# Patient Record
Sex: FEMALE | Race: BLACK OR AFRICAN AMERICAN | NOT HISPANIC OR LATINO
[De-identification: names, ages, dates, MRNs, and addresses within clinical notes are randomized per-mention and may not be internally consistent; named-entity substitution may affect disease eponyms.]

---

## 2017-11-15 ENCOUNTER — APPOINTMENT (OUTPATIENT)
Dept: UROLOGY | Facility: CLINIC | Age: 62
End: 2017-11-15
Payer: COMMERCIAL

## 2017-11-15 VITALS
SYSTOLIC BLOOD PRESSURE: 120 MMHG | OXYGEN SATURATION: 98 % | TEMPERATURE: 98.4 F | WEIGHT: 164.31 LBS | HEIGHT: 65 IN | BODY MASS INDEX: 27.38 KG/M2 | HEART RATE: 64 BPM | DIASTOLIC BLOOD PRESSURE: 60 MMHG

## 2017-11-15 DIAGNOSIS — Z87.39 PERSONAL HISTORY OF OTHER DISEASES OF THE MUSCULOSKELETAL SYSTEM AND CONNECTIVE TISSUE: ICD-10-CM

## 2017-11-15 DIAGNOSIS — Z87.19 PERSONAL HISTORY OF OTHER DISEASES OF THE DIGESTIVE SYSTEM: ICD-10-CM

## 2017-11-15 DIAGNOSIS — Z87.09 PERSONAL HISTORY OF OTHER DISEASES OF THE RESPIRATORY SYSTEM: ICD-10-CM

## 2017-11-15 PROCEDURE — 99203 OFFICE O/P NEW LOW 30 MIN: CPT

## 2017-11-15 RX ORDER — ALBUTEROL SULFATE 90 UG/1
108 (90 BASE) AEROSOL, METERED RESPIRATORY (INHALATION)
Qty: 8 | Refills: 0 | Status: ACTIVE | COMMUNITY
Start: 2017-01-17

## 2017-11-15 RX ORDER — MONTELUKAST 10 MG/1
10 TABLET, FILM COATED ORAL
Qty: 30 | Refills: 0 | Status: ACTIVE | COMMUNITY
Start: 2017-06-09

## 2017-11-15 RX ORDER — CHROMIUM 200 MCG
TABLET ORAL
Refills: 0 | Status: ACTIVE | COMMUNITY

## 2017-11-15 RX ORDER — OMEPRAZOLE 20 MG/1
20 CAPSULE, DELAYED RELEASE ORAL
Qty: 30 | Refills: 0 | Status: ACTIVE | COMMUNITY
Start: 2017-10-10

## 2017-11-15 RX ORDER — FLUTICASONE PROPIONATE 50 MCG
50 SPRAY, SUSPENSION NASAL
Refills: 0 | Status: ACTIVE | COMMUNITY

## 2017-11-15 RX ORDER — FLUTICASONE PROPIONATE 50 UG/1
50 SPRAY, METERED NASAL
Qty: 16 | Refills: 0 | Status: ACTIVE | COMMUNITY
Start: 2017-02-27

## 2017-11-29 ENCOUNTER — FORM ENCOUNTER (OUTPATIENT)
Age: 62
End: 2017-11-29

## 2017-11-30 ENCOUNTER — OUTPATIENT (OUTPATIENT)
Dept: OUTPATIENT SERVICES | Facility: HOSPITAL | Age: 62
LOS: 1 days | End: 2017-11-30
Payer: COMMERCIAL

## 2017-11-30 ENCOUNTER — APPOINTMENT (OUTPATIENT)
Dept: CT IMAGING | Facility: IMAGING CENTER | Age: 62
End: 2017-11-30
Payer: COMMERCIAL

## 2017-11-30 ENCOUNTER — APPOINTMENT (OUTPATIENT)
Dept: CT IMAGING | Facility: IMAGING CENTER | Age: 62
End: 2017-11-30

## 2017-11-30 DIAGNOSIS — Z98.51 TUBAL LIGATION STATUS: Chronic | ICD-10-CM

## 2017-11-30 DIAGNOSIS — N20.0 CALCULUS OF KIDNEY: ICD-10-CM

## 2017-11-30 PROCEDURE — 74176 CT ABD & PELVIS W/O CONTRAST: CPT | Mod: 26

## 2017-12-06 ENCOUNTER — APPOINTMENT (OUTPATIENT)
Dept: UROLOGY | Facility: CLINIC | Age: 62
End: 2017-12-06
Payer: COMMERCIAL

## 2017-12-06 DIAGNOSIS — R10.9 UNSPECIFIED ABDOMINAL PAIN: ICD-10-CM

## 2017-12-06 DIAGNOSIS — Z87.442 PERSONAL HISTORY OF URINARY CALCULI: ICD-10-CM

## 2017-12-06 PROCEDURE — 99213 OFFICE O/P EST LOW 20 MIN: CPT

## 2021-05-05 ENCOUNTER — APPOINTMENT (OUTPATIENT)
Dept: ORTHOPEDIC SURGERY | Facility: CLINIC | Age: 66
End: 2021-05-05
Payer: MEDICARE

## 2021-05-05 ENCOUNTER — NON-APPOINTMENT (OUTPATIENT)
Age: 66
End: 2021-05-05

## 2021-05-05 PROCEDURE — 73564 X-RAY EXAM KNEE 4 OR MORE: CPT | Mod: LT

## 2021-05-05 PROCEDURE — 99072 ADDL SUPL MATRL&STAF TM PHE: CPT

## 2021-05-05 PROCEDURE — 99204 OFFICE O/P NEW MOD 45 MIN: CPT

## 2021-05-17 ENCOUNTER — OUTPATIENT (OUTPATIENT)
Dept: OUTPATIENT SERVICES | Facility: HOSPITAL | Age: 66
LOS: 1 days | End: 2021-05-17
Payer: COMMERCIAL

## 2021-05-17 VITALS
SYSTOLIC BLOOD PRESSURE: 153 MMHG | TEMPERATURE: 96 F | WEIGHT: 166.01 LBS | RESPIRATION RATE: 12 BRPM | DIASTOLIC BLOOD PRESSURE: 80 MMHG | HEIGHT: 63 IN | OXYGEN SATURATION: 99 % | HEART RATE: 60 BPM

## 2021-05-17 DIAGNOSIS — Z98.51 TUBAL LIGATION STATUS: Chronic | ICD-10-CM

## 2021-05-17 DIAGNOSIS — M17.12 UNILATERAL PRIMARY OSTEOARTHRITIS, LEFT KNEE: ICD-10-CM

## 2021-05-17 DIAGNOSIS — S50.852A SUPERFICIAL FOREIGN BODY OF LEFT FOREARM, INITIAL ENCOUNTER: ICD-10-CM

## 2021-05-17 DIAGNOSIS — Z01.818 ENCOUNTER FOR OTHER PREPROCEDURAL EXAMINATION: ICD-10-CM

## 2021-05-17 LAB
ALBUMIN SERPL ELPH-MCNC: 3.6 G/DL — SIGNIFICANT CHANGE UP (ref 3.3–5)
ALP SERPL-CCNC: 144 U/L — HIGH (ref 30–120)
ALT FLD-CCNC: 28 U/L DA — SIGNIFICANT CHANGE UP (ref 10–60)
ANION GAP SERPL CALC-SCNC: 3 MMOL/L — LOW (ref 5–17)
APTT BLD: 34.9 SEC — SIGNIFICANT CHANGE UP (ref 27.5–35.5)
AST SERPL-CCNC: 20 U/L — SIGNIFICANT CHANGE UP (ref 10–40)
BILIRUB SERPL-MCNC: 0.9 MG/DL — SIGNIFICANT CHANGE UP (ref 0.2–1.2)
BLD GP AB SCN SERPL QL: SIGNIFICANT CHANGE UP
BUN SERPL-MCNC: 11 MG/DL — SIGNIFICANT CHANGE UP (ref 7–23)
CALCIUM SERPL-MCNC: 9.2 MG/DL — SIGNIFICANT CHANGE UP (ref 8.4–10.5)
CHLORIDE SERPL-SCNC: 108 MMOL/L — SIGNIFICANT CHANGE UP (ref 96–108)
CO2 SERPL-SCNC: 31 MMOL/L — SIGNIFICANT CHANGE UP (ref 22–31)
CREAT SERPL-MCNC: 0.85 MG/DL — SIGNIFICANT CHANGE UP (ref 0.5–1.3)
GLUCOSE SERPL-MCNC: 109 MG/DL — HIGH (ref 70–99)
HCT VFR BLD CALC: 47 % — HIGH (ref 34.5–45)
HGB BLD-MCNC: 14.5 G/DL — SIGNIFICANT CHANGE UP (ref 11.5–15.5)
INR BLD: 1.12 RATIO — SIGNIFICANT CHANGE UP (ref 0.88–1.16)
MCHC RBC-ENTMCNC: 26.6 PG — LOW (ref 27–34)
MCHC RBC-ENTMCNC: 30.9 GM/DL — LOW (ref 32–36)
MCV RBC AUTO: 86.1 FL — SIGNIFICANT CHANGE UP (ref 80–100)
MRSA PCR RESULT.: SIGNIFICANT CHANGE UP
NRBC # BLD: 0 /100 WBCS — SIGNIFICANT CHANGE UP (ref 0–0)
PLATELET # BLD AUTO: 238 K/UL — SIGNIFICANT CHANGE UP (ref 150–400)
POTASSIUM SERPL-MCNC: 4.4 MMOL/L — SIGNIFICANT CHANGE UP (ref 3.5–5.3)
POTASSIUM SERPL-SCNC: 4.4 MMOL/L — SIGNIFICANT CHANGE UP (ref 3.5–5.3)
PROT SERPL-MCNC: 7.4 G/DL — SIGNIFICANT CHANGE UP (ref 6–8.3)
PROTHROM AB SERPL-ACNC: 13.5 SEC — SIGNIFICANT CHANGE UP (ref 10.6–13.6)
RBC # BLD: 5.46 M/UL — HIGH (ref 3.8–5.2)
RBC # FLD: 14.1 % — SIGNIFICANT CHANGE UP (ref 10.3–14.5)
S AUREUS DNA NOSE QL NAA+PROBE: SIGNIFICANT CHANGE UP
SODIUM SERPL-SCNC: 142 MMOL/L — SIGNIFICANT CHANGE UP (ref 135–145)
WBC # BLD: 4.78 K/UL — SIGNIFICANT CHANGE UP (ref 3.8–10.5)
WBC # FLD AUTO: 4.78 K/UL — SIGNIFICANT CHANGE UP (ref 3.8–10.5)

## 2021-05-17 PROCEDURE — 93010 ELECTROCARDIOGRAM REPORT: CPT

## 2021-05-17 PROCEDURE — G0463: CPT

## 2021-05-17 PROCEDURE — 93005 ELECTROCARDIOGRAM TRACING: CPT

## 2021-05-17 NOTE — H&P PST ADULT - MUSCULOSKELETAL
detailed exam no joint erythema/no joint warmth/no calf tenderness/decreased ROM due to pain details…

## 2021-05-17 NOTE — H&P PST ADULT - NSICDXPROBLEM_GEN_ALL_CORE_FT
PROBLEM DIAGNOSES  Problem: Osteoarthritis of left knee  Assessment and Plan: Left total knee replacement is planned for 5/27/2021.  Diagnostic testing performed; medical clearance requested.  Pre op instructions were reviewed; best wishes offered.        R/O PROBLEM DIAGNOSES  Problem: R/O Osteoarthritis of left knee  Assessment and Plan:

## 2021-05-17 NOTE — H&P PST ADULT - ASSESSMENT
64 yo female is scheduled for left total knee replacement on 5/27/2021 at [facility].

## 2021-05-17 NOTE — H&P PST ADULT - HISTORY OF PRESENT ILLNESS
64 yo female reports longstanging left knee pain with swelling rating 10/10 when she is weight bearing, stair climbing.  She is scheduled for left total knee replacement on 5/27/2021 at [facility].

## 2021-05-17 NOTE — H&P PST ADULT - NSICDXPASTMEDICALHX_GEN_ALL_CORE_FT
PAST MEDICAL HISTORY:  Asthma     COVID-19 vaccine series completed 3/31/2021    Osteoarthritis of left knee     Seasonal allergies

## 2021-05-19 ENCOUNTER — APPOINTMENT (OUTPATIENT)
Dept: ORTHOPEDIC SURGERY | Facility: CLINIC | Age: 66
End: 2021-05-19
Payer: MEDICARE

## 2021-05-19 PROCEDURE — 99213 OFFICE O/P EST LOW 20 MIN: CPT

## 2021-05-21 RX ORDER — ACETAMINOPHEN 500 MG
1000 TABLET ORAL ONCE
Refills: 0 | Status: COMPLETED | OUTPATIENT
Start: 2021-06-01 | End: 2021-06-01

## 2021-05-21 RX ORDER — CEFAZOLIN SODIUM 1 G
2000 VIAL (EA) INJECTION ONCE
Refills: 0 | Status: COMPLETED | OUTPATIENT
Start: 2021-06-01 | End: 2021-06-01

## 2021-05-21 RX ORDER — CHLORHEXIDINE GLUCONATE 213 G/1000ML
1 SOLUTION TOPICAL ONCE
Refills: 0 | Status: COMPLETED | OUTPATIENT
Start: 2021-06-01 | End: 2021-06-01

## 2021-05-21 RX ORDER — TRANEXAMIC ACID 100 MG/ML
1000 INJECTION, SOLUTION INTRAVENOUS ONCE
Refills: 0 | Status: COMPLETED | OUTPATIENT
Start: 2021-06-01 | End: 2021-06-01

## 2021-05-21 RX ORDER — APREPITANT 80 MG/1
40 CAPSULE ORAL ONCE
Refills: 0 | Status: COMPLETED | OUTPATIENT
Start: 2021-06-01 | End: 2021-06-01

## 2021-05-24 ENCOUNTER — LABORATORY RESULT (OUTPATIENT)
Age: 66
End: 2021-05-24

## 2021-05-27 RX ORDER — POLYETHYLENE GLYCOL 3350 17 G/17G
17 POWDER, FOR SOLUTION ORAL AT BEDTIME
Refills: 0 | Status: DISCONTINUED | OUTPATIENT
Start: 2021-06-01 | End: 2021-06-03

## 2021-05-27 RX ORDER — SODIUM CHLORIDE 9 MG/ML
500 INJECTION INTRAMUSCULAR; INTRAVENOUS; SUBCUTANEOUS ONCE
Refills: 0 | Status: COMPLETED | OUTPATIENT
Start: 2021-06-01 | End: 2021-06-01

## 2021-05-27 RX ORDER — SENNA PLUS 8.6 MG/1
2 TABLET ORAL AT BEDTIME
Refills: 0 | Status: DISCONTINUED | OUTPATIENT
Start: 2021-06-01 | End: 2021-06-03

## 2021-05-27 RX ORDER — CELECOXIB 200 MG/1
200 CAPSULE ORAL EVERY 12 HOURS
Refills: 0 | Status: DISCONTINUED | OUTPATIENT
Start: 2021-06-01 | End: 2021-06-03

## 2021-05-27 RX ORDER — OXYCODONE HYDROCHLORIDE 5 MG/1
10 TABLET ORAL
Refills: 0 | Status: DISCONTINUED | OUTPATIENT
Start: 2021-06-01 | End: 2021-06-03

## 2021-05-27 RX ORDER — OXYCODONE HYDROCHLORIDE 5 MG/1
5 TABLET ORAL
Refills: 0 | Status: DISCONTINUED | OUTPATIENT
Start: 2021-06-01 | End: 2021-06-03

## 2021-05-27 RX ORDER — ONDANSETRON 8 MG/1
4 TABLET, FILM COATED ORAL EVERY 6 HOURS
Refills: 0 | Status: DISCONTINUED | OUTPATIENT
Start: 2021-06-01 | End: 2021-06-03

## 2021-05-27 RX ORDER — ACETAMINOPHEN 500 MG
1000 TABLET ORAL EVERY 8 HOURS
Refills: 0 | Status: DISCONTINUED | OUTPATIENT
Start: 2021-06-02 | End: 2021-06-03

## 2021-05-27 RX ORDER — ACETAMINOPHEN 500 MG
1000 TABLET ORAL ONCE
Refills: 0 | Status: COMPLETED | OUTPATIENT
Start: 2021-06-01 | End: 2021-06-01

## 2021-05-27 RX ORDER — HYDROMORPHONE HYDROCHLORIDE 2 MG/ML
0.5 INJECTION INTRAMUSCULAR; INTRAVENOUS; SUBCUTANEOUS
Refills: 0 | Status: DISCONTINUED | OUTPATIENT
Start: 2021-06-01 | End: 2021-06-03

## 2021-05-27 RX ORDER — CEFAZOLIN SODIUM 1 G
2000 VIAL (EA) INJECTION EVERY 8 HOURS
Refills: 0 | Status: COMPLETED | OUTPATIENT
Start: 2021-06-01 | End: 2021-06-02

## 2021-05-27 RX ORDER — PANTOPRAZOLE SODIUM 20 MG/1
40 TABLET, DELAYED RELEASE ORAL
Refills: 0 | Status: DISCONTINUED | OUTPATIENT
Start: 2021-06-01 | End: 2021-06-03

## 2021-05-27 RX ORDER — MAGNESIUM HYDROXIDE 400 MG/1
30 TABLET, CHEWABLE ORAL DAILY
Refills: 0 | Status: DISCONTINUED | OUTPATIENT
Start: 2021-06-01 | End: 2021-06-03

## 2021-05-27 RX ORDER — SODIUM CHLORIDE 9 MG/ML
1000 INJECTION, SOLUTION INTRAVENOUS
Refills: 0 | Status: DISCONTINUED | OUTPATIENT
Start: 2021-06-01 | End: 2021-06-02

## 2021-05-28 NOTE — ASU PATIENT PROFILE, ADULT - VISION (WITH CORRECTIVE LENSES IF THE PATIENT USUALLY WEARS THEM):
Aspen neck brace applied by danitza RN and Kamron Golden RN. MSP's intact before and after.      Vandana Marcos RN  05/12/21 4485 Partially impaired: cannot see medication labels or newsprint, but can see obstacles in path, and the surrounding layout; can count fingers at arm's length

## 2021-05-31 ENCOUNTER — TRANSCRIPTION ENCOUNTER (OUTPATIENT)
Age: 66
End: 2021-05-31

## 2021-06-01 ENCOUNTER — RESULT REVIEW (OUTPATIENT)
Age: 66
End: 2021-06-01

## 2021-06-01 ENCOUNTER — INPATIENT (INPATIENT)
Facility: HOSPITAL | Age: 66
LOS: 1 days | Discharge: HOME | DRG: 470 | End: 2021-06-03
Attending: ORTHOPAEDIC SURGERY | Admitting: ORTHOPAEDIC SURGERY
Payer: COMMERCIAL

## 2021-06-01 ENCOUNTER — TRANSCRIPTION ENCOUNTER (OUTPATIENT)
Age: 66
End: 2021-06-01

## 2021-06-01 ENCOUNTER — APPOINTMENT (OUTPATIENT)
Dept: ORTHOPEDIC SURGERY | Facility: HOSPITAL | Age: 66
End: 2021-06-01
Payer: MEDICARE

## 2021-06-01 VITALS
DIASTOLIC BLOOD PRESSURE: 71 MMHG | SYSTOLIC BLOOD PRESSURE: 141 MMHG | TEMPERATURE: 98 F | RESPIRATION RATE: 12 BRPM | OXYGEN SATURATION: 100 % | HEIGHT: 63 IN | WEIGHT: 215.39 LBS | HEART RATE: 68 BPM

## 2021-06-01 DIAGNOSIS — Z98.51 TUBAL LIGATION STATUS: Chronic | ICD-10-CM

## 2021-06-01 DIAGNOSIS — S50.852A SUPERFICIAL FOREIGN BODY OF LEFT FOREARM, INITIAL ENCOUNTER: ICD-10-CM

## 2021-06-01 DIAGNOSIS — M17.12 UNILATERAL PRIMARY OSTEOARTHRITIS, LEFT KNEE: ICD-10-CM

## 2021-06-01 DIAGNOSIS — Z01.818 ENCOUNTER FOR OTHER PREPROCEDURAL EXAMINATION: ICD-10-CM

## 2021-06-01 LAB
ANION GAP SERPL CALC-SCNC: 8 MMOL/L — SIGNIFICANT CHANGE UP (ref 5–17)
BUN SERPL-MCNC: 10 MG/DL — SIGNIFICANT CHANGE UP (ref 7–23)
CALCIUM SERPL-MCNC: 8.3 MG/DL — LOW (ref 8.4–10.5)
CHLORIDE SERPL-SCNC: 108 MMOL/L — SIGNIFICANT CHANGE UP (ref 96–108)
CO2 SERPL-SCNC: 26 MMOL/L — SIGNIFICANT CHANGE UP (ref 22–31)
CREAT SERPL-MCNC: 1.06 MG/DL — SIGNIFICANT CHANGE UP (ref 0.5–1.3)
GLUCOSE SERPL-MCNC: 182 MG/DL — HIGH (ref 70–99)
HCT VFR BLD CALC: 44.6 % — SIGNIFICANT CHANGE UP (ref 34.5–45)
HGB BLD-MCNC: 14 G/DL — SIGNIFICANT CHANGE UP (ref 11.5–15.5)
MCHC RBC-ENTMCNC: 27 PG — SIGNIFICANT CHANGE UP (ref 27–34)
MCHC RBC-ENTMCNC: 31.4 GM/DL — LOW (ref 32–36)
MCV RBC AUTO: 85.9 FL — SIGNIFICANT CHANGE UP (ref 80–100)
NRBC # BLD: 0 /100 WBCS — SIGNIFICANT CHANGE UP (ref 0–0)
PLATELET # BLD AUTO: 225 K/UL — SIGNIFICANT CHANGE UP (ref 150–400)
POTASSIUM SERPL-MCNC: 3.4 MMOL/L — LOW (ref 3.5–5.3)
POTASSIUM SERPL-SCNC: 3.4 MMOL/L — LOW (ref 3.5–5.3)
RBC # BLD: 5.19 M/UL — SIGNIFICANT CHANGE UP (ref 3.8–5.2)
RBC # FLD: 13.8 % — SIGNIFICANT CHANGE UP (ref 10.3–14.5)
SODIUM SERPL-SCNC: 142 MMOL/L — SIGNIFICANT CHANGE UP (ref 135–145)
WBC # BLD: 11.12 K/UL — HIGH (ref 3.8–10.5)
WBC # FLD AUTO: 11.12 K/UL — HIGH (ref 3.8–10.5)

## 2021-06-01 PROCEDURE — 88311 DECALCIFY TISSUE: CPT | Mod: 26

## 2021-06-01 PROCEDURE — 88305 TISSUE EXAM BY PATHOLOGIST: CPT | Mod: 26

## 2021-06-01 PROCEDURE — 73560 X-RAY EXAM OF KNEE 1 OR 2: CPT | Mod: 26,LT

## 2021-06-01 PROCEDURE — 27447 TOTAL KNEE ARTHROPLASTY: CPT | Mod: LT

## 2021-06-01 PROCEDURE — ZZZZZ: CPT

## 2021-06-01 PROCEDURE — 99223 1ST HOSP IP/OBS HIGH 75: CPT

## 2021-06-01 RX ORDER — ONDANSETRON 8 MG/1
4 TABLET, FILM COATED ORAL ONCE
Refills: 0 | Status: DISCONTINUED | OUTPATIENT
Start: 2021-06-01 | End: 2021-06-01

## 2021-06-01 RX ORDER — HYDROMORPHONE HYDROCHLORIDE 2 MG/ML
0.5 INJECTION INTRAMUSCULAR; INTRAVENOUS; SUBCUTANEOUS
Refills: 0 | Status: DISCONTINUED | OUTPATIENT
Start: 2021-06-01 | End: 2021-06-01

## 2021-06-01 RX ORDER — SODIUM CHLORIDE 9 MG/ML
1000 INJECTION, SOLUTION INTRAVENOUS
Refills: 0 | Status: DISCONTINUED | OUTPATIENT
Start: 2021-06-01 | End: 2021-06-01

## 2021-06-01 RX ORDER — FLUTICASONE PROPIONATE 50 MCG
1 SPRAY, SUSPENSION NASAL
Refills: 0 | Status: DISCONTINUED | OUTPATIENT
Start: 2021-06-01 | End: 2021-06-03

## 2021-06-01 RX ORDER — ALBUTEROL 90 UG/1
2 AEROSOL, METERED ORAL EVERY 6 HOURS
Refills: 0 | Status: DISCONTINUED | OUTPATIENT
Start: 2021-06-01 | End: 2021-06-03

## 2021-06-01 RX ORDER — SODIUM CHLORIDE 9 MG/ML
500 INJECTION INTRAMUSCULAR; INTRAVENOUS; SUBCUTANEOUS ONCE
Refills: 0 | Status: COMPLETED | OUTPATIENT
Start: 2021-06-01 | End: 2021-06-01

## 2021-06-01 RX ORDER — ASPIRIN/CALCIUM CARB/MAGNESIUM 324 MG
81 TABLET ORAL EVERY 12 HOURS
Refills: 0 | Status: DISCONTINUED | OUTPATIENT
Start: 2021-06-02 | End: 2021-06-03

## 2021-06-01 RX ADMIN — SODIUM CHLORIDE 125 MILLILITER(S): 9 INJECTION, SOLUTION INTRAVENOUS at 20:48

## 2021-06-01 RX ADMIN — OXYCODONE HYDROCHLORIDE 10 MILLIGRAM(S): 5 TABLET ORAL at 20:47

## 2021-06-01 RX ADMIN — SENNA PLUS 2 TABLET(S): 8.6 TABLET ORAL at 21:07

## 2021-06-01 RX ADMIN — CELECOXIB 200 MILLIGRAM(S): 200 CAPSULE ORAL at 21:07

## 2021-06-01 RX ADMIN — Medication 1000 MILLIGRAM(S): at 17:07

## 2021-06-01 RX ADMIN — Medication 100 MILLIGRAM(S): at 17:04

## 2021-06-01 RX ADMIN — SODIUM CHLORIDE 1000 MILLILITER(S): 9 INJECTION INTRAMUSCULAR; INTRAVENOUS; SUBCUTANEOUS at 15:36

## 2021-06-01 RX ADMIN — CELECOXIB 200 MILLIGRAM(S): 200 CAPSULE ORAL at 21:39

## 2021-06-01 RX ADMIN — SODIUM CHLORIDE 1000 MILLILITER(S): 9 INJECTION INTRAMUSCULAR; INTRAVENOUS; SUBCUTANEOUS at 12:27

## 2021-06-01 RX ADMIN — Medication 1000 MILLIGRAM(S): at 23:39

## 2021-06-01 RX ADMIN — Medication 400 MILLIGRAM(S): at 23:06

## 2021-06-01 RX ADMIN — OXYCODONE HYDROCHLORIDE 10 MILLIGRAM(S): 5 TABLET ORAL at 21:20

## 2021-06-01 RX ADMIN — APREPITANT 40 MILLIGRAM(S): 80 CAPSULE ORAL at 07:19

## 2021-06-01 RX ADMIN — Medication 400 MILLIGRAM(S): at 16:43

## 2021-06-01 RX ADMIN — CHLORHEXIDINE GLUCONATE 1 APPLICATION(S): 213 SOLUTION TOPICAL at 07:19

## 2021-06-01 NOTE — PHYSICAL THERAPY INITIAL EVALUATION ADULT - GAIT DEVIATIONS NOTED, PT EVAL
left knee buckling/decreased ivy/decreased velocity of limb motion/decreased step length/decreased stride length/decreased weight-shifting ability

## 2021-06-01 NOTE — PHYSICAL THERAPY INITIAL EVALUATION ADULT - RANGE OF MOTION EXAMINATION, REHAB EVAL
left knee flexion 60 deg in sitting/Right LE ROM was WFL (within functional limits)/deficits as listed below

## 2021-06-01 NOTE — DISCHARGE NOTE PROVIDER - HOSPITAL COURSE
The patient is a 66 y.o.  female with severe osteoarthritis of the left knee.   She was evaluated by the PST dept at [facility] and obtained the appropriate medical clearances.  After admission on 6/1/21 and receiving pre-operative parenteral prophylactic antibiotics (ancef), the patient  underwent an  uncomplicated left TKA by orthopedic surgeon Dr. Mitch Hernández.    A medical consultation from the Hospitalist service was obtained for post-operative medical co-management. Typical Physical & occupational therapy modalities post TKA were performed including ambulation training, range of motion, ADL's, and transfers. Ecotrin 81 mg every 12 hrs, along w/ bilat venodynes, was given for DVT prophylaxis (caprini 8).  The patient had a clean appearing surgical incision with no sign of surgical site infections and had a stable neuro / vascular exam of the operated extremity.  After progression of mobility guided by the PT/ OT staff,  the patient was felt to benefit from further rehabilitative care for restoration to level of function. This was felt to best be accomplished in a ________________.  Discharge and Orthopedic Care instructions were delineated in the Discharge Plan and reviewed with the patient. All medications were delineated in the medication reconciliation tool and key points were reviewed with the patient. They were deemed stable from an Orthopedic & medical standpoint for discharge today.  Upon ( discharge from the rehab facility ) or (completion of Home care ) they will be  following up with Dr. Hernández for office  follow up Orthopedic care.   The patient is a 66 y.o.  female with severe osteoarthritis of the left knee.   She was evaluated by the PST dept at [facility] and obtained the appropriate medical clearances.  After admission on 6/1/21 and receiving pre-operative parenteral prophylactic antibiotics (ancef), the patient  underwent an  uncomplicated left TKA by orthopedic surgeon Dr. Mitch Hernández.    A medical consultation from the Hospitalist service was obtained for post-operative medical co-management. Typical Physical & occupational therapy modalities post TKA were performed including ambulation training, range of motion, ADL's, and transfers. Ecotrin 81 mg every 12 hrs, along w/ bilat venodynes, was given for DVT prophylaxis (caprini 8).  The patient had a clean appearing surgical incision with no sign of surgical site infections and had a stable neuro / vascular exam of the operated extremity.  After progression of mobility guided by the PT/ OT staff,  the patient was felt to benefit from further rehabilitative care for restoration to level of function. This was felt to best be accomplished in a sub acute rehab.  Discharge and Orthopedic Care instructions were delineated in the Discharge Plan and reviewed with the patient. All medications were delineated in the medication reconciliation tool and key points were reviewed with the patient. They were deemed stable from an Orthopedic & medical standpoint for discharge today.  Upon ( discharge from the rehab facility ) or (completion of Home care ) they will be  following up with Dr. Hernández for office  follow up Orthopedic care.

## 2021-06-01 NOTE — DISCHARGE NOTE PROVIDER - CARE PROVIDER_API CALL
WILFREDO Hernández (MD)  Orthopaedic Surgery  [address], [address]  [place] [address]  Phone: [phone]  Fax: [phone]  Established Patient  Follow Up Time: 2 weeks

## 2021-06-01 NOTE — PHYSICAL THERAPY INITIAL EVALUATION ADULT - ADDITIONAL COMMENTS
Pt lives with spouse in a house w/ 4 steps to enter with no rail, 6 steps inside with rail.  Pt has no DME.  Pt reports  has been experiencing vertigo since Sunday and will not be able to assist pt at home.

## 2021-06-01 NOTE — OCCUPATIONAL THERAPY INITIAL EVALUATION ADULT - DIAGNOSIS, OT EVAL
Pt with impaired ROM, strength, balance, sensation impacting pt's ability to complete ADLs, IADLs, functional mobility/transfers.

## 2021-06-01 NOTE — OCCUPATIONAL THERAPY INITIAL EVALUATION ADULT - LIVES WITH, PROFILE
Pt lives with her , who she reports has recently gotten sick with vertigo and is unable to help her at home, in a private home, 4 steps to enter with 6 steps inside with a tub. Pt was independent with ADLs, IADLs, functional mobility/transfers, driving prior to admission without AD./spouse

## 2021-06-01 NOTE — DISCHARGE NOTE PROVIDER - INSTRUCTIONS
regular  For Constipation :   • Increase your water intake. Drink at least 8 glasses of water daily.  • Try adding fiber to your diet by eating fruits, vegetables and foods that are rich in grains.  • If you do experience constipation, you may take an over-the-counter stool softener/laxative such as Rhona Colace, Senekot, miralax or  Milk of Magnesia.

## 2021-06-01 NOTE — DISCHARGE NOTE PROVIDER - NSDCFUADDAPPT_GEN_ALL_CORE_FT
Call Dr Hernández's office for follow up appt in 2 weeks.  It is advisable to follow up w/ your pcp in 2-3 weeks to be sure there are no underlying problems.

## 2021-06-01 NOTE — DISCHARGE NOTE PROVIDER - NSDCCPCAREPLAN_GEN_ALL_CORE_FT
PRINCIPAL DISCHARGE DIAGNOSIS  Diagnosis: Primary osteoarthritis of left knee  Assessment and Plan of Treatment: left TKA  Physical Therapy/Occupational Therapy for ambulation, transfers, stairs, ADL's, Range of Motion Exercises, Isometrics.  Full weight bearing as tolerated with rolling walker  Range of Motion Goals: Flexion 120 degrees; Extension 0 degrees  Keep incision clean and dry.  You may shower.  Prineo may get slightly wet, but don't soak it as it may loosen.  Prineo dressing removal 14 days after surgery at rehab facility or Surgeon's office

## 2021-06-01 NOTE — OCCUPATIONAL THERAPY INITIAL EVALUATION ADULT - BALANCE TRAINING, PT EVAL
Goal: Pt will improve dynamic standing balance to G to increase safety for ADL/IADL completion in 2 weeks.

## 2021-06-01 NOTE — DISCHARGE NOTE PROVIDER - NSDCMRMEDTOKEN_GEN_ALL_CORE_FT
Albuterol (Eqv-Proventil HFA) 90 mcg/inh inhalation aerosol: 2 puff(s) inhaled every 6 hours, As Needed  Benadryl 50 mg oral capsule: 1 cap(s) orally once, As Needed  Flonase 50 mcg/inh nasal spray: 1 spray(s) nasal once a day  multivitamin:   Vitamin B12:   Vitamin D3 2000 intl units (50 mcg) oral capsule:    acetaminophen 500 mg oral tablet: 2 tab(s) orally every 8 hours  Albuterol (Eqv-Proventil HFA) 90 mcg/inh inhalation aerosol: 2 puff(s) inhaled every 6 hours, As Needed  aspirin 81 mg oral delayed release tablet: 1 tab(s) orally every 12 hours  take two hours before celebrex  for 42 days   Benadryl 50 mg oral capsule: 1 cap(s) orally once, As Needed  bisacodyl 10 mg rectal suppository: 1 suppository(ies) rectal once, As needed, Constipation  celecoxib 200 mg oral capsule: 1 cap(s) orally every 12 hours  30 days   fluticasone 50 mcg/inh nasal spray: 1 spray(s) nasal   magnesium hydroxide 8% oral suspension: 30 milliliter(s) orally once a day, As needed, Constipation  multivitamin:   omeprazole 20 mg oral delayed release tablet: 1 tab(s) orally once a day   oxyCODONE 10 mg oral tablet: 1 tab(s) orally every 3 hours, As needed, Severe Pain (7 - 10)  oxyCODONE 5 mg oral tablet: 1 tab(s) orally every 3 hours, As needed, Moderate Pain (4 - 6)  polyethylene glycol 3350 oral powder for reconstitution: 17 gram(s) orally once a day (at bedtime)  senna oral tablet: 2 tab(s) orally once a day (at bedtime)  Vitamin B12:   Vitamin D3 2000 intl units (50 mcg) oral capsule:

## 2021-06-01 NOTE — CONSULT NOTE ADULT - ASSESSMENT
Aftercare following left TKR 6/1/21    pain meds oxycodone nad dilaudid.  Monitor for respiratory depression and lethargy.  PT/OT.  DVT prophylaxis.  DVT ppx: [x ]ASA81 [ ] UNJ125 [ ] Lovenox [ ] Coumadin   [ ] Eliquis [  ] Heparin  Dispo:     Home [ ]     Acute Rehab [ ]     BILLIE [ ]     TBD [x ]    OA left Knee Aftercare following left TKR 6/1/21    pain meds oxycodone nad dilaudid.  Monitor for respiratory depression and lethargy.  PT/OT.  DVT prophylaxis.  DVT ppx: [x ]ASA81 [ ] KNT719 [ ] Lovenox [ ] Coumadin   [ ] Eliquis [  ] Heparin  Dispo:     Home [ ]     Acute Rehab [ ]     BILLIE [ ]     TBD [x ]    OA left Knee    Plan of care was discuss with patient, all questions were answered, seems understand and in agreement.   Aftercare following left TKR 6/1/21    pain meds oxycodone nad dilaudid.  Monitor for respiratory depression and lethargy.  PT/OT.  DVT prophylaxis.  DVT ppx: [x ]ASA81 [ ] VAW850 [ ] Lovenox [ ] Coumadin   [ ] Eliquis [  ] Heparin  Dispo:     Home [ ]     Acute Rehab [ ]     BILLIE [ ]     TBD [x ]    OA left Knee    Asthma   stable.    Plan of care was discuss with patient, all questions were answered, seems understand and in agreement.

## 2021-06-01 NOTE — CONSULT NOTE ADULT - SUBJECTIVE AND OBJECTIVE BOX
Patient is a 66y old  Female who presents with a chief complaint of left knee pain-- for left TKA (01 Jun 2021 11:50)      64 yo female reports longstanging left knee pain with swelling rating 10/10 when she is weight bearing, stair climbing.  S/Pr left total knee replacement on 5/27/2021 at [facility].      HPI: Patient is seen and examined.      PAST MEDICAL & SURGICAL HISTORY:  Seasonal allergies    Osteoarthritis of left knee    Asthma    COVID-19 vaccine series completed  3/31/2021    History of tubal ligation  1991        MEDICATIONS  (STANDING):  acetaminophen  IVPB .. 1000 milliGRAM(s) IV Intermittent once  acetaminophen  IVPB .. 1000 milliGRAM(s) IV Intermittent once  ceFAZolin   IVPB 2000 milliGRAM(s) IV Intermittent every 8 hours  celecoxib 200 milliGRAM(s) Oral every 12 hours  fluticasone propionate 50 MICROgram(s)/spray Nasal Spray 1 Spray(s) Both Nostrils <User Schedule>  lactated ringers. 1000 milliLiter(s) (125 mL/Hr) IV Continuous <Continuous>  pantoprazole    Tablet 40 milliGRAM(s) Oral before breakfast  polyethylene glycol 3350 17 Gram(s) Oral at bedtime  senna 2 Tablet(s) Oral at bedtime  sodium chloride 0.9% Bolus 500 milliLiter(s) IV Bolus once    MEDICATIONS  (PRN):  ALBUTerol    90 MICROgram(s) HFA Inhaler 2 Puff(s) Inhalation every 6 hours PRN Shortness of Breath and/or Wheezing  HYDROmorphone  Injectable 0.5 milliGRAM(s) IV Push every 3 hours PRN breakthrough pain  magnesium hydroxide Suspension 30 milliLiter(s) Oral daily PRN Constipation  ondansetron Injectable 4 milliGRAM(s) IV Push every 6 hours PRN Nausea and/or Vomiting  oxyCODONE    IR 5 milliGRAM(s) Oral every 3 hours PRN Moderate Pain (4 - 6)  oxyCODONE    IR 10 milliGRAM(s) Oral every 3 hours PRN Severe Pain (7 - 10)      Allergies    No Known Allergies    Intolerances    SOCIAL HISTORY:  Smoker:  YES / NO        PACK YEARS:                         WHEN QUIT?  ETOH use:  YES / NO               FREQUENCY / QUANTITY:  Ilicit Drug use:  YES / NO  Occupation:  Assisted device use (Cane / Walker):  Live with:      FAMILY HISTORY:  No pertinent family history in first degree relatives        Vital Signs Last 24 Hrs  T(C): 36.4 (01 Jun 2021 13:35), Max: 36.5 (01 Jun 2021 07:19)  T(F): 97.6 (01 Jun 2021 13:35), Max: 97.7 (01 Jun 2021 07:19)  HR: 93 (01 Jun 2021 14:35) (62 - 93)  BP: 123/82 (01 Jun 2021 14:35) (88/66 - 141/71)  BP(mean): --  RR: 16 (01 Jun 2021 14:15) (9 - 20)  SpO2: 100% (01 Jun 2021 14:35) (98% - 100%)           Patient is a 66y old  Female who presents with a chief complaint of left knee pain-- for left TKA (01 Jun 2021 11:50)      66 yo female reports longstanging left knee pain with swelling rating 10/10 when she is weight bearing, stair climbing.  S/Pr left total knee replacement on 5/27/2021 at [facility].      HPI: Patient is seen and examined.  Pain is controlled.      PAST MEDICAL & SURGICAL HISTORY:  Seasonal allergies    Osteoarthritis of left knee    Asthma    COVID-19 vaccine series completed  3/31/2021    History of tubal ligation  1991        MEDICATIONS  (STANDING):  acetaminophen  IVPB .. 1000 milliGRAM(s) IV Intermittent once  acetaminophen  IVPB .. 1000 milliGRAM(s) IV Intermittent once  ceFAZolin   IVPB 2000 milliGRAM(s) IV Intermittent every 8 hours  celecoxib 200 milliGRAM(s) Oral every 12 hours  fluticasone propionate 50 MICROgram(s)/spray Nasal Spray 1 Spray(s) Both Nostrils <User Schedule>  lactated ringers. 1000 milliLiter(s) (125 mL/Hr) IV Continuous <Continuous>  pantoprazole    Tablet 40 milliGRAM(s) Oral before breakfast  polyethylene glycol 3350 17 Gram(s) Oral at bedtime  senna 2 Tablet(s) Oral at bedtime  sodium chloride 0.9% Bolus 500 milliLiter(s) IV Bolus once    MEDICATIONS  (PRN):  ALBUTerol    90 MICROgram(s) HFA Inhaler 2 Puff(s) Inhalation every 6 hours PRN Shortness of Breath and/or Wheezing  HYDROmorphone  Injectable 0.5 milliGRAM(s) IV Push every 3 hours PRN breakthrough pain  magnesium hydroxide Suspension 30 milliLiter(s) Oral daily PRN Constipation  ondansetron Injectable 4 milliGRAM(s) IV Push every 6 hours PRN Nausea and/or Vomiting  oxyCODONE    IR 5 milliGRAM(s) Oral every 3 hours PRN Moderate Pain (4 - 6)  oxyCODONE    IR 10 milliGRAM(s) Oral every 3 hours PRN Severe Pain (7 - 10)      Allergies    No Known Allergies    Intolerances    SOCIAL HISTORY:  Smoker:  NO        PACK YEARS:                         WHEN QUIT?  ETOH use:  NO               FREQUENCY / QUANTITY:  Ilicit Drug use:   NO        FAMILY HISTORY:  No pertinent family history in first degree relatives        Vital Signs Last 24 Hrs  T(C): 36.4 (01 Jun 2021 13:35), Max: 36.5 (01 Jun 2021 07:19)  T(F): 97.6 (01 Jun 2021 13:35), Max: 97.7 (01 Jun 2021 07:19)  HR: 93 (01 Jun 2021 14:35) (62 - 93)  BP: 123/82 (01 Jun 2021 14:35) (88/66 - 141/71)  BP(mean): --  RR: 16 (01 Jun 2021 14:15) (9 - 20)  SpO2: 100% (01 Jun 2021 14:35) (98% - 100%)

## 2021-06-02 ENCOUNTER — TRANSCRIPTION ENCOUNTER (OUTPATIENT)
Age: 66
End: 2021-06-02

## 2021-06-02 LAB
ANION GAP SERPL CALC-SCNC: 5 MMOL/L — SIGNIFICANT CHANGE UP (ref 5–17)
BUN SERPL-MCNC: 18 MG/DL — SIGNIFICANT CHANGE UP (ref 7–23)
CALCIUM SERPL-MCNC: 8.8 MG/DL — SIGNIFICANT CHANGE UP (ref 8.4–10.5)
CHLORIDE SERPL-SCNC: 107 MMOL/L — SIGNIFICANT CHANGE UP (ref 96–108)
CO2 SERPL-SCNC: 30 MMOL/L — SIGNIFICANT CHANGE UP (ref 22–31)
CREAT SERPL-MCNC: 1.18 MG/DL — SIGNIFICANT CHANGE UP (ref 0.5–1.3)
GLUCOSE SERPL-MCNC: 145 MG/DL — HIGH (ref 70–99)
HCT VFR BLD CALC: 36 % — SIGNIFICANT CHANGE UP (ref 34.5–45)
HGB BLD-MCNC: 11.1 G/DL — LOW (ref 11.5–15.5)
MCHC RBC-ENTMCNC: 26.6 PG — LOW (ref 27–34)
MCHC RBC-ENTMCNC: 30.8 GM/DL — LOW (ref 32–36)
MCV RBC AUTO: 86.1 FL — SIGNIFICANT CHANGE UP (ref 80–100)
NRBC # BLD: 0 /100 WBCS — SIGNIFICANT CHANGE UP (ref 0–0)
PLATELET # BLD AUTO: 200 K/UL — SIGNIFICANT CHANGE UP (ref 150–400)
POTASSIUM SERPL-MCNC: 4.4 MMOL/L — SIGNIFICANT CHANGE UP (ref 3.5–5.3)
POTASSIUM SERPL-SCNC: 4.4 MMOL/L — SIGNIFICANT CHANGE UP (ref 3.5–5.3)
RBC # BLD: 4.18 M/UL — SIGNIFICANT CHANGE UP (ref 3.8–5.2)
RBC # FLD: 13.9 % — SIGNIFICANT CHANGE UP (ref 10.3–14.5)
SARS-COV-2 RNA SPEC QL NAA+PROBE: SIGNIFICANT CHANGE UP
SODIUM SERPL-SCNC: 142 MMOL/L — SIGNIFICANT CHANGE UP (ref 135–145)
WBC # BLD: 14.99 K/UL — HIGH (ref 3.8–10.5)
WBC # FLD AUTO: 14.99 K/UL — HIGH (ref 3.8–10.5)

## 2021-06-02 PROCEDURE — 99232 SBSQ HOSP IP/OBS MODERATE 35: CPT

## 2021-06-02 RX ORDER — MAGNESIUM HYDROXIDE 400 MG/1
30 TABLET, CHEWABLE ORAL
Qty: 0 | Refills: 0 | DISCHARGE
Start: 2021-06-02

## 2021-06-02 RX ORDER — ASPIRIN/CALCIUM CARB/MAGNESIUM 324 MG
1 TABLET ORAL
Qty: 83 | Refills: 0
Start: 2021-06-02

## 2021-06-02 RX ORDER — CELECOXIB 200 MG/1
1 CAPSULE ORAL
Qty: 60 | Refills: 0
Start: 2021-06-02

## 2021-06-02 RX ORDER — CELECOXIB 200 MG/1
1 CAPSULE ORAL
Qty: 0 | Refills: 0 | DISCHARGE
Start: 2021-06-02

## 2021-06-02 RX ORDER — ASPIRIN/CALCIUM CARB/MAGNESIUM 324 MG
1 TABLET ORAL
Qty: 0 | Refills: 0 | DISCHARGE
Start: 2021-06-02

## 2021-06-02 RX ORDER — SENNA PLUS 8.6 MG/1
2 TABLET ORAL
Qty: 0 | Refills: 0 | DISCHARGE
Start: 2021-06-02

## 2021-06-02 RX ORDER — OXYCODONE HYDROCHLORIDE 5 MG/1
1 TABLET ORAL
Qty: 0 | Refills: 0 | DISCHARGE
Start: 2021-06-02

## 2021-06-02 RX ORDER — ACETAMINOPHEN 500 MG
2 TABLET ORAL
Qty: 0 | Refills: 0 | DISCHARGE
Start: 2021-06-02

## 2021-06-02 RX ORDER — FLUTICASONE PROPIONATE 50 MCG
1 SPRAY, SUSPENSION NASAL
Qty: 0 | Refills: 0 | DISCHARGE
Start: 2021-06-02

## 2021-06-02 RX ORDER — POLYETHYLENE GLYCOL 3350 17 G/17G
17 POWDER, FOR SOLUTION ORAL
Qty: 0 | Refills: 0 | DISCHARGE
Start: 2021-06-02

## 2021-06-02 RX ORDER — OMEPRAZOLE 10 MG/1
1 CAPSULE, DELAYED RELEASE ORAL
Qty: 30 | Refills: 0
Start: 2021-06-02

## 2021-06-02 RX ORDER — OXYCODONE HYDROCHLORIDE 5 MG/1
1 TABLET ORAL
Qty: 42 | Refills: 0
Start: 2021-06-02

## 2021-06-02 RX ADMIN — OXYCODONE HYDROCHLORIDE 5 MILLIGRAM(S): 5 TABLET ORAL at 18:03

## 2021-06-02 RX ADMIN — HYDROMORPHONE HYDROCHLORIDE 0.5 MILLIGRAM(S): 2 INJECTION INTRAMUSCULAR; INTRAVENOUS; SUBCUTANEOUS at 22:56

## 2021-06-02 RX ADMIN — Medication 81 MILLIGRAM(S): at 06:31

## 2021-06-02 RX ADMIN — Medication 81 MILLIGRAM(S): at 17:03

## 2021-06-02 RX ADMIN — Medication 1000 MILLIGRAM(S): at 20:45

## 2021-06-02 RX ADMIN — PANTOPRAZOLE SODIUM 40 MILLIGRAM(S): 20 TABLET, DELAYED RELEASE ORAL at 06:31

## 2021-06-02 RX ADMIN — OXYCODONE HYDROCHLORIDE 10 MILLIGRAM(S): 5 TABLET ORAL at 20:45

## 2021-06-02 RX ADMIN — OXYCODONE HYDROCHLORIDE 5 MILLIGRAM(S): 5 TABLET ORAL at 18:33

## 2021-06-02 RX ADMIN — Medication 1 SPRAY(S): at 08:09

## 2021-06-02 RX ADMIN — ONDANSETRON 4 MILLIGRAM(S): 8 TABLET, FILM COATED ORAL at 08:12

## 2021-06-02 RX ADMIN — OXYCODONE HYDROCHLORIDE 10 MILLIGRAM(S): 5 TABLET ORAL at 05:10

## 2021-06-02 RX ADMIN — OXYCODONE HYDROCHLORIDE 10 MILLIGRAM(S): 5 TABLET ORAL at 04:34

## 2021-06-02 RX ADMIN — CELECOXIB 200 MILLIGRAM(S): 200 CAPSULE ORAL at 20:45

## 2021-06-02 RX ADMIN — OXYCODONE HYDROCHLORIDE 10 MILLIGRAM(S): 5 TABLET ORAL at 21:15

## 2021-06-02 RX ADMIN — SENNA PLUS 2 TABLET(S): 8.6 TABLET ORAL at 20:45

## 2021-06-02 RX ADMIN — Medication 100 MILLIGRAM(S): at 01:06

## 2021-06-02 RX ADMIN — HYDROMORPHONE HYDROCHLORIDE 0.5 MILLIGRAM(S): 2 INJECTION INTRAMUSCULAR; INTRAVENOUS; SUBCUTANEOUS at 23:18

## 2021-06-02 RX ADMIN — Medication 1000 MILLIGRAM(S): at 06:31

## 2021-06-02 RX ADMIN — Medication 1000 MILLIGRAM(S): at 06:35

## 2021-06-02 NOTE — DISCHARGE NOTE NURSING/CASE MANAGEMENT/SOCIAL WORK - PATIENT PORTAL LINK FT
You can access the FollowMyHealth Patient Portal offered by [facility] by registering at the following website: [email or web address]. By joining [employer]’s FollowMyHealth portal, you will also be able to view your health information using other applications (apps) compatible with our system.

## 2021-06-02 NOTE — DISCHARGE NOTE NURSING/CASE MANAGEMENT/SOCIAL WORK - NSDCFUADDAPPT_GEN_ALL_CORE_FT
Call Dr Hernádnez's office for follow up appt in 2 weeks.  It is advisable to follow up w/ your pcp in 2-3 weeks to be sure there are no underlying problems.

## 2021-06-03 VITALS
TEMPERATURE: 98 F | RESPIRATION RATE: 17 BRPM | HEART RATE: 66 BPM | DIASTOLIC BLOOD PRESSURE: 71 MMHG | SYSTOLIC BLOOD PRESSURE: 135 MMHG | OXYGEN SATURATION: 100 %

## 2021-06-03 PROCEDURE — 97535 SELF CARE MNGMENT TRAINING: CPT

## 2021-06-03 PROCEDURE — 88305 TISSUE EXAM BY PATHOLOGIST: CPT

## 2021-06-03 PROCEDURE — 97161 PT EVAL LOW COMPLEX 20 MIN: CPT

## 2021-06-03 PROCEDURE — C1713: CPT

## 2021-06-03 PROCEDURE — 99232 SBSQ HOSP IP/OBS MODERATE 35: CPT

## 2021-06-03 PROCEDURE — 97165 OT EVAL LOW COMPLEX 30 MIN: CPT

## 2021-06-03 PROCEDURE — 97110 THERAPEUTIC EXERCISES: CPT

## 2021-06-03 PROCEDURE — 85027 COMPLETE CBC AUTOMATED: CPT

## 2021-06-03 PROCEDURE — 36415 COLL VENOUS BLD VENIPUNCTURE: CPT

## 2021-06-03 PROCEDURE — 97116 GAIT TRAINING THERAPY: CPT

## 2021-06-03 PROCEDURE — C1776: CPT

## 2021-06-03 PROCEDURE — 73560 X-RAY EXAM OF KNEE 1 OR 2: CPT

## 2021-06-03 PROCEDURE — 80048 BASIC METABOLIC PNL TOTAL CA: CPT

## 2021-06-03 PROCEDURE — 97530 THERAPEUTIC ACTIVITIES: CPT

## 2021-06-03 PROCEDURE — C1889: CPT

## 2021-06-03 PROCEDURE — 87635 SARS-COV-2 COVID-19 AMP PRB: CPT

## 2021-06-03 PROCEDURE — 88311 DECALCIFY TISSUE: CPT

## 2021-06-03 PROCEDURE — 94640 AIRWAY INHALATION TREATMENT: CPT

## 2021-06-03 RX ADMIN — Medication 1000 MILLIGRAM(S): at 05:42

## 2021-06-03 RX ADMIN — OXYCODONE HYDROCHLORIDE 5 MILLIGRAM(S): 5 TABLET ORAL at 12:06

## 2021-06-03 RX ADMIN — OXYCODONE HYDROCHLORIDE 10 MILLIGRAM(S): 5 TABLET ORAL at 03:34

## 2021-06-03 RX ADMIN — OXYCODONE HYDROCHLORIDE 5 MILLIGRAM(S): 5 TABLET ORAL at 11:36

## 2021-06-03 RX ADMIN — PANTOPRAZOLE SODIUM 40 MILLIGRAM(S): 20 TABLET, DELAYED RELEASE ORAL at 05:42

## 2021-06-03 RX ADMIN — Medication 81 MILLIGRAM(S): at 05:42

## 2021-06-03 RX ADMIN — CELECOXIB 200 MILLIGRAM(S): 200 CAPSULE ORAL at 09:17

## 2021-06-03 RX ADMIN — OXYCODONE HYDROCHLORIDE 10 MILLIGRAM(S): 5 TABLET ORAL at 04:14

## 2021-06-03 RX ADMIN — ONDANSETRON 4 MILLIGRAM(S): 8 TABLET, FILM COATED ORAL at 09:22

## 2021-06-03 RX ADMIN — CELECOXIB 200 MILLIGRAM(S): 200 CAPSULE ORAL at 09:47

## 2021-06-03 NOTE — PROGRESS NOTE ADULT - ASSESSMENT
66F with Asthma and OA admitted for aftercare following Left TKR.     S/P Left TKR  POD 0  Continue Bowel and pain control regimen.   Incentive Spirometer for lung expansion.  Work with PT to increase ambulation as per orthopedics.  Monitor Hgb and follow up electrolytes.   Orthopedics on board and following     Leukocytosis  Most likely reactive; Remains afebrile and no signs or symptoms of Infection  Monitor Off Abx and will trend CBC if needed    Asthma  Albuterol and Nebulizers PRN    Diet  Regular    DVT Prophylaxis  Asprin BID    Disposition  Full Code/Inpatient  Patient medically optimized for discharge home if cleared by PT and Ortho.
66F with Asthma and OA admitted for aftercare following Left TKR.     S/P Left TKR  POD 1  Continue Bowel and pain control regimen.   Incentive Spirometer for lung expansion.  Work with PT to increase ambulation as per orthopedics.  Monitor Hgb and follow up electrolytes.   Orthopedics on board and following     Leukocytosis  Resolved    Acute Blood Loss Anemia   Most likely perioperative losses; Currently asymtomatic   Will Trend Hgb and transfuse if Hgb <7.0   Check iron Studies, Folate, and B12     Asthma  Albuterol and Nebulizers PRN    Diet  Regular    DVT Prophylaxis  Asprin BID    Disposition  Full Code/Inpatient  Patient medically optimized for discharge home if cleared by PT and Ortho.

## 2021-06-03 NOTE — PROGRESS NOTE ADULT - SUBJECTIVE AND OBJECTIVE BOX
Subjective: Doing well with no overnight events.     MEDICATIONS  (STANDING):  acetaminophen   Tablet .. 1000 milliGRAM(s) Oral every 8 hours  aspirin enteric coated 81 milliGRAM(s) Oral every 12 hours  celecoxib 200 milliGRAM(s) Oral every 12 hours  fluticasone propionate 50 MICROgram(s)/spray Nasal Spray 1 Spray(s) Both Nostrils <User Schedule>  pantoprazole    Tablet 40 milliGRAM(s) Oral before breakfast  polyethylene glycol 3350 17 Gram(s) Oral at bedtime  senna 2 Tablet(s) Oral at bedtime    MEDICATIONS  (PRN):  ALBUTerol    90 MICROgram(s) HFA Inhaler 2 Puff(s) Inhalation every 6 hours PRN Shortness of Breath and/or Wheezing  bisacodyl Suppository 10 milliGRAM(s) Rectal once PRN Constipation  HYDROmorphone  Injectable 0.5 milliGRAM(s) IV Push every 3 hours PRN breakthrough pain  magnesium hydroxide Suspension 30 milliLiter(s) Oral daily PRN Constipation  ondansetron Injectable 4 milliGRAM(s) IV Push every 6 hours PRN Nausea and/or Vomiting  oxyCODONE    IR 10 milliGRAM(s) Oral every 3 hours PRN Severe Pain (7 - 10)  oxyCODONE    IR 5 milliGRAM(s) Oral every 3 hours PRN Moderate Pain (4 - 6)      Allergies    No Known Allergies    Intolerances        Vital Signs Last 24 Hrs  T(C): 36.8 (03 Jun 2021 07:59), Max: 37.5 (02 Jun 2021 19:42)  T(F): 98.2 (03 Jun 2021 07:59), Max: 99.5 (02 Jun 2021 19:42)  HR: 58 (03 Jun 2021 07:59) (58 - 73)  BP: 101/58 (03 Jun 2021 07:59) (96/59 - 126/70)  BP(mean): --  RR: 17 (03 Jun 2021 07:59) (17 - 18)  SpO2: 98% (03 Jun 2021 07:59) (96% - 100%)    PHYSICAL EXAM:  GENERAL: NAD, well-groomed, well-developed  HEAD:  Atraumatic, Normocephalic  ENMT: Moist mucous membranes,   NECK: Supple, No JVD, Normal thyroid  NERVOUS SYSTEM:  All 4 extremities mobile, no gross sensory deficits.   CHEST/LUNG: Clear to auscultation bilaterally; No rales, rhonchi, wheezing, or rubs  HEART: Regular rate and rhythm; No murmurs, rubs, or gallops  ABDOMEN: Soft, Nontender, Nondistended; Bowel sounds present  EXTREMITIES:  2+ Peripheral Pulses, No clubbing, cyanosis, or edema      LABS:                        10.0   8.99  )-----------( 185      ( 03 Jun 2021 07:57 )             31.5     02 Jun 2021 16:39    142    |  107    |  18     ----------------------------<  145    4.4     |  30     |  1.18     Ca    8.8        02 Jun 2021 16:39          CAPILLARY BLOOD GLUCOSE          RADIOLOGY & ADDITIONAL TESTS:    Imaging Personally Reviewed:  [ ] YES     Consultant(s) Notes Reviewed:      Care Discussed with Consultants/Other Providers:    Advanced Directives: [ ] DNR  [ ] No feeding tube  [ ] MOLST in chart  [ ] MOLST completed today  [ ] Unknown  
Orthopaedic Post Op Note    Procedure: Left TKR  Surgeon: Betty    66y Female comfortable. She reports mild nausea this morning but denies vomiting.  Reported pain score =1-2/10  Denies CP, SOB, numbness/tingling of extremities. Voiding without issues and tolerating diet. Hemovac came out overnight.     PE:  Vital Signs Last 24 Hrs  T(F): 97.8 (02 Jun 2021 07:38), Max: 98.2 (01 Jun 2021 23:10)  HR: 55 (02 Jun 2021 07:38) (54 - 93)  BP: 110/58 (02 Jun 2021 07:38) (88/66 - 151/73)  RR: 18 (02 Jun 2021 07:38) (9 - 20)  SpO2: 100% (02 Jun 2021 07:38) (98% - 100%)    General: Pt alert and oriented   Right Left Knee Dressing: prineo in place, C/D/I, scant sanguinous drainage from hemovac site.   Bilateral LEs:  Motor:   5/5 dorsiflexion, plantarflexion, EHL  Sensation intact to LT   + DP Pulses  SCDs in place    Post Op labs:  01 Jun 2021 16:48    142    |  108    |  10     ----------------------------<  182    3.4     |  26     |  1.06                  11.1   14.99 )-----------( 200      ( 02 Jun 2021 07:31 )             36.0       A/P: 66y Female POD#1 s/p left total knee replacement  - Stable  - Acetaminophen, oxycodone prn for Pain Control   - DVT ppx: Aspirin 81mg BID  - PT, OT per protocol  - F/U AM Labs  DCP = patient request subacute rehab       
Subjective: Patient doing well with no complaints at this time.     MEDICATIONS  (STANDING):  acetaminophen   Tablet .. 1000 milliGRAM(s) Oral every 8 hours  aspirin enteric coated 81 milliGRAM(s) Oral every 12 hours  celecoxib 200 milliGRAM(s) Oral every 12 hours  fluticasone propionate 50 MICROgram(s)/spray Nasal Spray 1 Spray(s) Both Nostrils <User Schedule>  lactated ringers. 1000 milliLiter(s) (125 mL/Hr) IV Continuous <Continuous>  pantoprazole    Tablet 40 milliGRAM(s) Oral before breakfast  polyethylene glycol 3350 17 Gram(s) Oral at bedtime  senna 2 Tablet(s) Oral at bedtime    MEDICATIONS  (PRN):  ALBUTerol    90 MICROgram(s) HFA Inhaler 2 Puff(s) Inhalation every 6 hours PRN Shortness of Breath and/or Wheezing  HYDROmorphone  Injectable 0.5 milliGRAM(s) IV Push every 3 hours PRN breakthrough pain  magnesium hydroxide Suspension 30 milliLiter(s) Oral daily PRN Constipation  ondansetron Injectable 4 milliGRAM(s) IV Push every 6 hours PRN Nausea and/or Vomiting  oxyCODONE    IR 5 milliGRAM(s) Oral every 3 hours PRN Moderate Pain (4 - 6)  oxyCODONE    IR 10 milliGRAM(s) Oral every 3 hours PRN Severe Pain (7 - 10)      Allergies    No Known Allergies    Intolerances        Vital Signs Last 24 Hrs  T(C): 36.6 (02 Jun 2021 07:38), Max: 36.8 (01 Jun 2021 23:10)  T(F): 97.8 (02 Jun 2021 07:38), Max: 98.2 (01 Jun 2021 23:10)  HR: 55 (02 Jun 2021 07:38) (54 - 93)  BP: 110/58 (02 Jun 2021 07:38) (88/66 - 151/73)  BP(mean): --  RR: 18 (02 Jun 2021 07:38) (9 - 20)  SpO2: 100% (02 Jun 2021 07:38) (98% - 100%)    PHYSICAL EXAM:  GENERAL: NAD, well-groomed, well-developed  HEAD:  Atraumatic, Normocephalic  ENMT: Moist mucous membranes,   NECK: Supple, No JVD, Normal thyroid  NERVOUS SYSTEM:  All 4 extremities mobile, no gross sensory deficits.   CHEST/LUNG: Clear to auscultation bilaterally; No rales, rhonchi, wheezing, or rubs  HEART: Regular rate and rhythm; No murmurs, rubs, or gallops  ABDOMEN: Soft, Nontender, Nondistended; Bowel sounds present  EXTREMITIES:  2+ Peripheral Pulses, No clubbing, cyanosis, or edema      LABS:                        11.1   14.99 )-----------( 200      ( 02 Jun 2021 07:31 )             36.0     01 Jun 2021 16:48    142    |  108    |  10     ----------------------------<  182    3.4     |  26     |  1.06     Ca    8.3        01 Jun 2021 16:48          CAPILLARY BLOOD GLUCOSE          RADIOLOGY & ADDITIONAL TESTS:    Imaging Personally Reviewed:  [ ] YES     Consultant(s) Notes Reviewed:      Care Discussed with Consultants/Other Providers:    Advanced Directives: [ ] DNR  [ ] No feeding tube  [ ] MOLST in chart  [ ] MOLST completed today  [ ] Unknown  
Orthopaedic Post Op Note    Procedure: Left TKR  Surgeon: Mitch Hernández    66y Female comfortable, without complaints. Has not been up with PT yet. Tolerating diet. Reported pain score = 0  Denies N/V, CP, SOB, numbness/tingling of extremities.    PE:  Vital Signs Last 24 Hrs  T(C): 36.4 (01 Jun 2021 13:35), Max: 36.5 (01 Jun 2021 07:19)  T(F): 97.6 (01 Jun 2021 13:35), Max: 97.7 (01 Jun 2021 07:19)  HR: 93 (01 Jun 2021 14:35) (62 - 93)  BP: 123/82 (01 Jun 2021 14:35) (88/66 - 141/71)  RR: 16 (01 Jun 2021 14:15) (9 - 20)  SpO2: 100% (01 Jun 2021 14:35) (98% - 100%)  General: Pt alert and oriented   Lungs: + BS CTA bilaterally  Heart: +S1 & S2 heard, RRR  Abd: + BS heard, soft, NT, ND  Left Knee Dressing: C/D/I, functioning hemovac drain in place  Bilateral LEs:  Motor:   5/5 dorsiflexion, plantarflexion, EHL  Sensation intact to LT  calves supple & NT  2+ DP Pulses  SCDs in place      A/P: 66y Female POD#0 s/p Left TKR  - Stable  - Acetaminophen, Celebrex, Oxycodone, Dilaudid for Pain Control   - DVT ppx: Aspirin 81mg q 12h  - Rhona op IV abx: Ancef  - PT, OT per protocol  - F/U AM Labs  DCP = rehab tomorrow if medically cleared, bed available, insurance authorized      
Procedure: Left TKR  POD #: 2  S: Pt without complaints. No SOB,CP, N/V. Tolerated Diet well.   Pain comfortable  on  Interval Rx.   No BM yet, + flatus, No abdominal pain.  Pain Rx:  acetaminophen   Tablet .. 1000 milliGRAM(s) Oral every 8 hours  celecoxib 200 milliGRAM(s) Oral every 12 hours  HYDROmorphone  Injectable 0.5 milliGRAM(s) IV Push every 3 hours PRN  ondansetron Injectable 4 milliGRAM(s) IV Push every 6 hours PRN  oxyCODONE    IR 5 milliGRAM(s) Oral every 3 hours PRN  oxyCODONE    IR 10 milliGRAM(s) Oral every 3 hours PRN    O: General: On exam, No Apparent Distress  Vital Signs Last 24 Hrs  T(C): 36.9 (03 Jun 2021 03:07), Max: 37.5 (02 Jun 2021 19:42)  T(F): 98.4 (03 Jun 2021 03:07), Max: 99.5 (02 Jun 2021 19:42)  HR: 71 (03 Jun 2021 03:07) (60 - 73)  BP: 96/59 (03 Jun 2021 03:07) (96/59 - 126/70)  BP(mean): --  RR: 18 (03 Jun 2021 03:07) (17 - 18)  SpO2: 96% (03 Jun 2021 03:07) (96% - 100%)    Lungs: BS clear bilat.  Heart: RRR no murmur  Abdomen: + BS, soft , benign exam     Left knee w/ prineo dry/intact.  Covered w/ spandage  ROM 0-80  Neurologic:  Has sensation over feet & toes bilat. Full AROM bilat feet & toes. EHL/AT = 5/5  Vascular: Feet toes warm, pink. DP = 2+. No calf tenderness bilat..  VTEP: On Bilat. Venodynes + aspirin enteric coated 81 milliGRAM(s) Oral every 12 hours    I&O's Detail      Activity in PT yesterday Noted. Walked 80' and did 10 stairs, but she says stairs at home are steep.  Hospitalist input noted.  Labs Today: pending                        11.1   14.99 )-----------( 200      ( 02 Jun 2021 07:31 )             36.0       06-02    142  |  107  |  18  ----------------------------<  145<H>  4.4   |  30  |  1.18    Ca    8.8      02 Jun 2021 16:39        Primary Orthopedic Assessment:  • Stable from Orthopedic perspective  • Neuro motor exam stable  • Labs: CBC / Chem stable      Plan:   • Continue:  PT/OT/WBAT with assistance of a walker/Ice to knee/ Knee ROM         Incentive spirometry encouraged   • Continue DVT prophylaxis as prescribed, including use of compression devices and ankle pumps  • Continue Pain Rx  • Plans per Medicine   • Discharge planning – anticipated discharge is  Subacute Rehab facility when medically stable & cleared by PT/OT--today

## 2021-06-04 ENCOUNTER — OUTPATIENT (OUTPATIENT)
Dept: OUTPATIENT SERVICES | Facility: HOSPITAL | Age: 66
LOS: 1 days | Discharge: HOME | End: 2021-06-04
Payer: MEDICARE

## 2021-06-04 DIAGNOSIS — Z98.51 TUBAL LIGATION STATUS: Chronic | ICD-10-CM

## 2021-06-04 DIAGNOSIS — I82.402 ACUTE EMBOLISM AND THROMBOSIS OF UNSPECIFIED DEEP VEINS OF LEFT LOWER EXTREMITY: ICD-10-CM

## 2021-06-04 PROCEDURE — 93971 EXTREMITY STUDY: CPT | Mod: 26

## 2021-06-16 ENCOUNTER — RESULT REVIEW (OUTPATIENT)
Age: 66
End: 2021-06-16

## 2021-06-16 ENCOUNTER — APPOINTMENT (OUTPATIENT)
Dept: ULTRASOUND IMAGING | Facility: CLINIC | Age: 66
End: 2021-06-16
Payer: MEDICARE

## 2021-06-16 ENCOUNTER — OUTPATIENT (OUTPATIENT)
Dept: OUTPATIENT SERVICES | Facility: HOSPITAL | Age: 66
LOS: 1 days | End: 2021-06-16
Payer: COMMERCIAL

## 2021-06-16 ENCOUNTER — APPOINTMENT (OUTPATIENT)
Dept: ORTHOPEDIC SURGERY | Facility: CLINIC | Age: 66
End: 2021-06-16
Payer: MEDICARE

## 2021-06-16 DIAGNOSIS — Z00.00 ENCOUNTER FOR GENERAL ADULT MEDICAL EXAMINATION WITHOUT ABNORMAL FINDINGS: ICD-10-CM

## 2021-06-16 DIAGNOSIS — Z98.51 TUBAL LIGATION STATUS: Chronic | ICD-10-CM

## 2021-06-16 DIAGNOSIS — R22.43 LOCALIZED SWELLING, MASS AND LUMP, LOWER LIMB, BILATERAL: ICD-10-CM

## 2021-06-16 PROCEDURE — 99024 POSTOP FOLLOW-UP VISIT: CPT

## 2021-06-16 PROCEDURE — 93970 EXTREMITY STUDY: CPT | Mod: 26

## 2021-06-16 PROCEDURE — 73562 X-RAY EXAM OF KNEE 3: CPT | Mod: LT

## 2021-06-16 NOTE — HISTORY OF PRESENT ILLNESS
[de-identified] : Left total knee replacement June 1, 2021 [de-identified] : Denies fever chills.  Ambulating with walker.  Status post discharge from subacute rehab underwent venous Doppler 1 week ago negative for DVT [de-identified] : Well-nourished no distress left knee incision healed moderate swelling left leg with ecchymosis laterally calf tender neurovascular intact [de-identified] : X-rays left knee 3 views reveal satisfactory component alignment total knee replacement [de-identified] : Left total knee replacement, rule out DVT [de-identified] : Repeat venous Doppler ultrasound if negative physical therapy, follow-up 1 month

## 2021-07-21 ENCOUNTER — APPOINTMENT (OUTPATIENT)
Dept: ORTHOPEDIC SURGERY | Facility: CLINIC | Age: 66
End: 2021-07-21
Payer: MEDICARE

## 2021-07-21 VITALS — BODY MASS INDEX: 28.66 KG/M2 | HEIGHT: 65 IN | WEIGHT: 172 LBS

## 2021-07-21 PROCEDURE — 99024 POSTOP FOLLOW-UP VISIT: CPT

## 2021-07-21 NOTE — HISTORY OF PRESENT ILLNESS
[de-identified] : Denies fever chills.  Ambulating with cane reports stiffness that is gradually improving and medial and lateral knee pain.  Denies fever chills [de-identified] : Left total knee replacement June 1, 2021 [de-identified] : Well-nourished no distress left knee incision healed no effusion range of motion 0/105 ligaments intact [de-identified] : Repeat x-rays left knee 3 views reveal satisfactory component alignment total knee replacement [de-identified] : Left total knee replacement, [de-identified] : Physical therapy follow-up 3 weeks discussed with patient possible manipulation

## 2021-08-11 ENCOUNTER — APPOINTMENT (OUTPATIENT)
Dept: ORTHOPEDIC SURGERY | Facility: CLINIC | Age: 66
End: 2021-08-11
Payer: MEDICARE

## 2021-08-11 PROCEDURE — 99024 POSTOP FOLLOW-UP VISIT: CPT

## 2021-08-11 NOTE — HISTORY OF PRESENT ILLNESS
[de-identified] : Left total knee replacement June 1, 2021 [de-identified] : Denies fever chills.  Ambulating with cane reports stiffness that is gradually improving [de-identified] : Well-nourished no distress left knee incision healed no effusion range of motion 0/ 110 ligaments intact [de-identified] : Left total knee replacement, [de-identified] : Physical therapy follow-up 2 months

## 2021-08-27 ENCOUNTER — APPOINTMENT (OUTPATIENT)
Dept: ORTHOPEDIC SURGERY | Facility: CLINIC | Age: 66
End: 2021-08-27
Payer: MEDICARE

## 2021-08-27 PROCEDURE — 99024 POSTOP FOLLOW-UP VISIT: CPT

## 2021-08-27 PROCEDURE — 73562 X-RAY EXAM OF KNEE 3: CPT | Mod: LT

## 2021-08-27 NOTE — HISTORY OF PRESENT ILLNESS
[de-identified] : Left total knee replacement June 1, 2021 [de-identified] : 2 days ago patient lost balance sustaining twisting injury to left knee.  She did not fall.  Developed acute pain throughout left knee.  Pain provoked by weightbearing and associated with sensation that the knee "feels loose". [de-identified] : Well-nourished no distress left knee incision healed no effusion range of motion 0/ 110 ligaments intact moderate to marked focal tenderness lateral femoral condyle lateral joint line neurovascular intact [de-identified] : X-ray left knee 3 views reveal component alignment maintained no fractures are noted [de-identified] : Left total knee replacement, possible sprain iliotibial band [de-identified] : Physical therapy, follow-up 3 weeks

## 2021-09-22 ENCOUNTER — APPOINTMENT (OUTPATIENT)
Dept: ORTHOPEDIC SURGERY | Facility: CLINIC | Age: 66
End: 2021-09-22
Payer: MEDICARE

## 2021-09-22 VITALS — BODY MASS INDEX: 28 KG/M2 | WEIGHT: 164 LBS | HEIGHT: 64 IN

## 2021-09-22 PROCEDURE — 99213 OFFICE O/P EST LOW 20 MIN: CPT

## 2021-09-22 NOTE — HISTORY OF PRESENT ILLNESS
[de-identified] : Followup left total knee replacement June 1, 2021 continues to experience operative site pain stiffness but does report that she is improving gradually.  Occasionally when going from sitting to standing feels as if the knee is "locked" denies swelling or giving out.  Is undergoing physical therapy

## 2021-09-22 NOTE — PHYSICAL EXAM
[de-identified] : Constitutional:Well nourished , well developed and in no acute distress\par Psychiatric: Alert and oriented to time place and person.Appropriate affect\par Respiratory: Unlabored respirations,no audible wheezing\par Cardiovascular: no leg swelling  ankle edema\par Vascular: no calf or thigh tenderness, \par Peripheral pulses; intact\par Skin:Head, neck, arms and lower extremities:no lesions or discoloration\par Lymphatics:No groin adenopathy\par Neurological: intact light touch sensation and grossly intact coordination and motor power.\par Knee; left incision healed no effusion flexion 0/110 ligaments intact no palpable clunking snapping or crepitus

## 2021-09-22 NOTE — DISCUSSION/SUMMARY
[de-identified] : Left total knee replacement with stiffness possible subclinical patella clunk\par Plan continue therapy follow-up 1 month

## 2021-09-30 ENCOUNTER — RESULT REVIEW (OUTPATIENT)
Age: 66
End: 2021-09-30

## 2021-10-13 ENCOUNTER — APPOINTMENT (OUTPATIENT)
Dept: ORTHOPEDIC SURGERY | Facility: CLINIC | Age: 66
End: 2021-10-13

## 2021-10-27 ENCOUNTER — APPOINTMENT (OUTPATIENT)
Dept: ORTHOPEDIC SURGERY | Facility: CLINIC | Age: 66
End: 2021-10-27
Payer: MEDICARE

## 2021-10-27 VITALS — WEIGHT: 166 LBS | HEIGHT: 64 IN | BODY MASS INDEX: 28.34 KG/M2

## 2021-10-27 PROCEDURE — 73562 X-RAY EXAM OF KNEE 3: CPT | Mod: RT

## 2021-10-27 PROCEDURE — 99213 OFFICE O/P EST LOW 20 MIN: CPT

## 2021-10-27 NOTE — PHYSICAL EXAM
[de-identified] : Constitutional:Well nourished , well developed and in no acute distress\par Psychiatric: Alert and oriented to time place and person.Appropriate affect\par Respiratory: Unlabored respirations,no audible wheezing\par Cardiovascular: no leg swelling  ankle edema\par Vascular: no calf or thigh tenderness, \par Peripheral pulses; intact\par Skin:Head, neck, arms and lower extremities:no lesions or discoloration\par Lymphatics:No groin adenopathy\par Neurological: intact light touch sensation and grossly intact coordination and motor power.\par Knee; left incision healed no effusion flexion 0/115 ligaments intact no palpable clunking snapping or crepitus\par Right knee varus alignment mild limitation of flexion [de-identified] : 4 views right knee end-stage medial compartment degenerative narrowing varus alignment subchondral sclerosis marginal osteophytes, left knee satisfactory postop appearance total knee

## 2021-10-27 NOTE — HISTORY OF PRESENT ILLNESS
[de-identified] : Patient seen in follow-up status post left total knee replacement June 1, 2021.  She is undergoing physical therapy and she is improving gradually with increased range of motion and decreased pain..  Occasionally when going from sitting to standing feels brief sharp pain lateral popliteal region.  Is undergoing physical therapy.  Continues to experience chronic pain right knee secondary to osteoarthritis

## 2021-10-27 NOTE — DISCUSSION/SUMMARY
[de-identified] : Impression left total knee replacement, right knee osteoarthritis\par Plan physical therapy follow-up 1 month.  Patient would like to improve further status post left total knee replacement before considering right total knee replacement

## 2021-12-01 ENCOUNTER — APPOINTMENT (OUTPATIENT)
Dept: ORTHOPEDIC SURGERY | Facility: CLINIC | Age: 66
End: 2021-12-01
Payer: MEDICARE

## 2021-12-01 VITALS — HEIGHT: 64 IN | WEIGHT: 165 LBS | BODY MASS INDEX: 28.17 KG/M2

## 2021-12-01 PROCEDURE — 99213 OFFICE O/P EST LOW 20 MIN: CPT

## 2021-12-01 NOTE — PHYSICAL EXAM
[de-identified] : Constitutional:Well nourished , well developed and in no acute distress\par Psychiatric: Alert and oriented to time place and person.Appropriate affect\par Respiratory: Unlabored respirations,no audible wheezing\par Cardiovascular: no leg swelling  ankle edema\par Vascular: no calf or thigh tenderness, \par Peripheral pulses; intact\par Skin:Head, neck, arms and lower extremities:no lesions or discoloration\par Lymphatics:No groin adenopathy\par Neurological: intact light touch sensation and grossly intact coordination and motor power.\par Knee; left incision healed no effusion flexion 0/115 ligaments intact palpable snapping crepitus with flexion extension\par

## 2021-12-01 NOTE — DISCUSSION/SUMMARY
[de-identified] : Impression left total knee replacement, patella clunk syndrome\par Plan physical therapy, follow-up 1 month, discussed possible arthroscopic debridement left knee persist

## 2021-12-01 NOTE — HISTORY OF PRESENT ILLNESS
[de-identified] : Patient reports the left knee feels "loose".  After walking 3-4 steps she reports that left knee "locks" patient has to "twist" left leg to unlock it.  Periodically she experiences associated "big noise".  Is undergoing physical therapy

## 2022-01-14 DIAGNOSIS — Z00.00 ENCOUNTER FOR GENERAL ADULT MEDICAL EXAMINATION W/OUT ABNORMAL FINDINGS: ICD-10-CM

## 2022-01-18 ENCOUNTER — APPOINTMENT (OUTPATIENT)
Dept: ORTHOPEDIC SURGERY | Facility: CLINIC | Age: 67
End: 2022-01-18
Payer: MEDICARE

## 2022-01-18 DIAGNOSIS — M17.12 UNILATERAL PRIMARY OSTEOARTHRITIS, LEFT KNEE: ICD-10-CM

## 2022-01-18 DIAGNOSIS — M25.869 OTHER SPECIFIED JOINT DISORDERS, UNSPECIFIED KNEE: ICD-10-CM

## 2022-01-18 DIAGNOSIS — M17.11 UNILATERAL PRIMARY OSTEOARTHRITIS, RIGHT KNEE: ICD-10-CM

## 2022-01-18 DIAGNOSIS — Z96.659 OTHER SPECIFIED JOINT DISORDERS, UNSPECIFIED KNEE: ICD-10-CM

## 2022-01-18 PROCEDURE — 73564 X-RAY EXAM KNEE 4 OR MORE: CPT | Mod: LT

## 2022-01-18 PROCEDURE — 99215 OFFICE O/P EST HI 40 MIN: CPT

## 2022-01-18 NOTE — HISTORY OF PRESENT ILLNESS
[de-identified] : This is very nice 66-year-old female experiencing chronic left knee pain, which is severe in intensity. The pain substantially limits activities of daily living. Walking tolerance is reduced.  Reviewed her medical history with her as well as Dr. Mane notes.  She underwent a left total knee arthroplasty June 1, 2021 by Dr. Hernández for left knee osteoarthritis.  She initially proved with then felt worse.  The pain is worse over the last 3 months.  She feels that the knee is loose at times but then other times it feels tight.  She has numbness in the lateral aspect of the knee lateral to the scar.  Sometimes it is locking most prominently when she goes from a sitting to standing position.  She feels and hears a noise when she does this.  She was diagnosed with patella clunk syndrome by Dr. Hernández who suggested a possible arthroscopic debridement.  She is here for second opinion.  Not ambulating with a cane.  Not take any oral anti-inflammatories at this time.  Using neoprene sleeve knee brace.  Has done physical therapy in the past.

## 2022-01-18 NOTE — PHYSICAL EXAM
[de-identified] : Patient is well nourished, well-developed, in no acute distress, with appropriate mood and affect. The patient is oriented to time, place, and person. Respirations are even and unlabored. Gait evaluation does not reveal a limp. There is no inguinal adenopathy. Examination of the contralateral knee shows normal range of motion, strength, no tenderness, and intact skin. The operative limb is well-perfused, has a well appearing surgical scar, and shows a grossly normal motor and sensory examination. Knee motion is painless and the left knee moves from 0-105 degrees. The knee is stable within that range-of-motion to AP and ML stress. The alignment of the knee is neutral. Muscle strength is normal. Quadriceps and hamstring muscle strength is normal bilaterally. Pedal pulses are palpable.  Positive audible and palpable clunk is noted when flexing and extending her knee whether is active or passive.  This is improved if I apply pressure to the patella. [de-identified] : AP, lateral, sunrise knee x-rays of the left knee were ordered and obtained in the office and demonstrate satisfactory position and alignment of the components are present. No signs of loosening are seen.

## 2022-01-18 NOTE — DISCUSSION/SUMMARY
[de-identified] : This patient has left knee patella clunk syndrome.  This may be also with a component of patella maltracking although the radiographs appear normal for the left knee.  I answered several questions that she had about her diagnosis as well various treatment options.  She wants to know why this happened how it happened how to prevent it in the future.  We discussed different treatment options.  She will be following up with Dr. Hernández for further surgical intervention which could be an arthroscopic debridement.  I explained to her in my hands I prefer to perform open debridement of this.  She will follow-up with her primary surgeon.

## 2022-01-24 ENCOUNTER — APPOINTMENT (OUTPATIENT)
Dept: ORTHOPEDIC SURGERY | Facility: CLINIC | Age: 67
End: 2022-01-24

## 2022-02-02 ENCOUNTER — APPOINTMENT (OUTPATIENT)
Dept: ORTHOPEDIC SURGERY | Facility: CLINIC | Age: 67
End: 2022-02-02
Payer: MEDICARE

## 2022-02-02 VITALS — HEIGHT: 65 IN | WEIGHT: 165 LBS | BODY MASS INDEX: 27.49 KG/M2

## 2022-02-02 PROCEDURE — 99213 OFFICE O/P EST LOW 20 MIN: CPT

## 2022-02-02 NOTE — PHYSICAL EXAM
[de-identified] : Constitutional:Well nourished , well developed and in no acute distress\par Psychiatric: Alert and oriented to time place and person.Appropriate affect\par Respiratory: Unlabored respirations,no audible wheezing\par Cardiovascular: no leg swelling  ankle edema\par Vascular: no calf or thigh tenderness, \par Peripheral pulses; intact\par Skin:Head, neck, arms and lower extremities:no lesions or discoloration\par Lymphatics:No groin adenopathy\par Neurological: intact light touch sensation and grossly intact coordination and motor power.\par Knee; left incision healed no effusion flexion 0/115 ligaments Previously palpable snapping crepitus left knee on terminal extension no longer present\par

## 2022-02-02 NOTE — DISCUSSION/SUMMARY
[de-identified] : Impression left total knee replacement, possible left patella clunk syndrome improved\par Recommendation in the absence of palpable snapping crepitus I am electing to recommend arthroscopic debridement.  Follow-up 2 months

## 2022-02-02 NOTE — HISTORY OF PRESENT ILLNESS
[de-identified] : Patient seen in follow-up of left knee.  Since last assessment reports that pain is "better controlled" still experiences intermittent aching anterior aspect left knee with walking denies swelling locking giving.  Has undergone second orthopedic opinion.

## 2022-04-06 ENCOUNTER — APPOINTMENT (OUTPATIENT)
Dept: ORTHOPEDIC SURGERY | Facility: CLINIC | Age: 67
End: 2022-04-06

## 2022-05-25 ENCOUNTER — APPOINTMENT (OUTPATIENT)
Dept: ORTHOPEDIC SURGERY | Facility: CLINIC | Age: 67
End: 2022-05-25
Payer: MEDICARE

## 2022-05-25 PROCEDURE — 99213 OFFICE O/P EST LOW 20 MIN: CPT

## 2022-05-25 NOTE — PHYSICAL EXAM
[de-identified] : Constitutional:Well nourished , well developed and in no acute distress\par Psychiatric: Alert and oriented to time place and person.Appropriate affect\par Respiratory: Unlabored respirations,no audible wheezing\par Cardiovascular: no leg swelling  ankle edema\par Vascular: no calf or thigh tenderness, \par Peripheral pulses; intact\par Skin:Head, neck, arms and lower extremities:no lesions or discoloration\par Lymphatics:No groin adenopathy\par Neurological: intact light touch sensation and grossly intact coordination and motor power.\par Knee; left incision healed no effusion flexion 0/115 ligaments Previously palpable snapping crepitus left knee on terminal extension no longer present Mild mid range varus laxity mildly positive flexion distraction\par

## 2022-05-25 NOTE — HISTORY OF PRESENT ILLNESS
[de-identified] : Follow-up left total knee replacement June 2021.  Reports decreased severity of left knee clunking.  Does complain of chronic intermittent pain lateral left knee and circumferential proximal leg tightness and intermittent swelling left distal thigh.  Is ambulating independently.

## 2022-05-25 NOTE — DISCUSSION/SUMMARY
[de-identified] : Impression left total knee replacement mild flexion instability, left knee patella clunk resolved, rule out chronic venous insufficiency\par Plan venous Doppler ultrasound

## 2022-08-09 RX ORDER — IBUPROFEN 200 MG
1 TABLET ORAL
Qty: 0 | Refills: 0 | DISCHARGE

## 2022-08-09 RX ORDER — FLUTICASONE PROPIONATE 50 MCG
1 SPRAY, SUSPENSION NASAL
Qty: 0 | Refills: 0 | DISCHARGE

## 2022-09-07 ENCOUNTER — APPOINTMENT (OUTPATIENT)
Dept: ORTHOPEDIC SURGERY | Facility: CLINIC | Age: 67
End: 2022-09-07

## 2022-09-07 VITALS — WEIGHT: 168 LBS | HEIGHT: 65 IN | BODY MASS INDEX: 27.99 KG/M2

## 2022-09-07 PROBLEM — Z92.29 PERSONAL HISTORY OF OTHER DRUG THERAPY: Chronic | Status: ACTIVE | Noted: 2021-05-17

## 2022-09-07 PROBLEM — M17.12 UNILATERAL PRIMARY OSTEOARTHRITIS, LEFT KNEE: Chronic | Status: ACTIVE | Noted: 2021-05-17

## 2022-09-07 PROBLEM — J30.2 OTHER SEASONAL ALLERGIC RHINITIS: Chronic | Status: ACTIVE | Noted: 2021-05-17

## 2022-09-07 PROBLEM — J45.909 UNSPECIFIED ASTHMA, UNCOMPLICATED: Chronic | Status: ACTIVE | Noted: 2021-05-17

## 2022-09-07 PROCEDURE — 99212 OFFICE O/P EST SF 10 MIN: CPT

## 2022-09-07 NOTE — HISTORY OF PRESENT ILLNESS
[de-identified] : Follow-up left total knee replacement June 2021. Still complains of stiffness, but overall has seen improvement. Reports decreased severity of left knee clunking. Does note that her postoperative pain has significantly improved since last visit, but rather her biggest complaint is stiffness. Is ambulating independently. Presents today for re-evaluation.

## 2022-09-07 NOTE — PHYSICAL EXAM
[de-identified] : Constitutional:Well nourished , well developed and in no acute distress\par Psychiatric: Alert and oriented to time place and person.Appropriate affect\par Respiratory: Unlabored respirations,no audible wheezing\par Cardiovascular: no leg swelling  ankle edema\par Vascular: no calf or thigh tenderness, \par Peripheral pulses; intact\par Skin:Head, neck, arms and lower extremities:no lesions or discoloration\par Lymphatics:No groin adenopathy\par Neurological: intact light touch sensation and grossly intact coordination and motor power.\par Knee; left incision healed no effusion flexion 0/115 ligaments Previously palpable snapping crepitus left knee on terminal extension no longer present Mild mid range varus laxity mildly positive flexion distraction\par

## 2022-09-07 NOTE — DISCUSSION/SUMMARY
[de-identified] : Impression left total knee replacement\par Plan continue home exercise program follow-up 4 months

## 2022-11-22 ENCOUNTER — EMERGENCY (EMERGENCY)
Facility: HOSPITAL | Age: 67
LOS: 0 days | Discharge: HOME | End: 2022-11-22
Attending: EMERGENCY MEDICINE

## 2022-11-22 VITALS
DIASTOLIC BLOOD PRESSURE: 88 MMHG | TEMPERATURE: 98 F | HEART RATE: 59 BPM | SYSTOLIC BLOOD PRESSURE: 152 MMHG | OXYGEN SATURATION: 98 % | RESPIRATION RATE: 20 BRPM

## 2022-11-22 VITALS
HEART RATE: 62 BPM | RESPIRATION RATE: 20 BRPM | WEIGHT: 167.99 LBS | TEMPERATURE: 98 F | DIASTOLIC BLOOD PRESSURE: 89 MMHG | SYSTOLIC BLOOD PRESSURE: 148 MMHG | HEIGHT: 64 IN | OXYGEN SATURATION: 99 %

## 2022-11-22 DIAGNOSIS — Z98.51 TUBAL LIGATION STATUS: ICD-10-CM

## 2022-11-22 DIAGNOSIS — Z20.822 CONTACT WITH AND (SUSPECTED) EXPOSURE TO COVID-19: ICD-10-CM

## 2022-11-22 DIAGNOSIS — R00.1 BRADYCARDIA, UNSPECIFIED: ICD-10-CM

## 2022-11-22 DIAGNOSIS — R07.89 OTHER CHEST PAIN: ICD-10-CM

## 2022-11-22 DIAGNOSIS — Z98.51 TUBAL LIGATION STATUS: Chronic | ICD-10-CM

## 2022-11-22 DIAGNOSIS — J45.909 UNSPECIFIED ASTHMA, UNCOMPLICATED: ICD-10-CM

## 2022-11-22 DIAGNOSIS — Z79.82 LONG TERM (CURRENT) USE OF ASPIRIN: ICD-10-CM

## 2022-11-22 DIAGNOSIS — I51.7 CARDIOMEGALY: ICD-10-CM

## 2022-11-22 DIAGNOSIS — R05.1 ACUTE COUGH: ICD-10-CM

## 2022-11-22 DIAGNOSIS — R09.89 OTHER SPECIFIED SYMPTOMS AND SIGNS INVOLVING THE CIRCULATORY AND RESPIRATORY SYSTEMS: ICD-10-CM

## 2022-11-22 DIAGNOSIS — Z86.16 PERSONAL HISTORY OF COVID-19: ICD-10-CM

## 2022-11-22 DIAGNOSIS — M17.12 UNILATERAL PRIMARY OSTEOARTHRITIS, LEFT KNEE: ICD-10-CM

## 2022-11-22 LAB
ALBUMIN SERPL ELPH-MCNC: 3.7 G/DL — SIGNIFICANT CHANGE UP (ref 3.3–5)
ALP SERPL-CCNC: 157 U/L — HIGH (ref 40–120)
ALT FLD-CCNC: 20 U/L — SIGNIFICANT CHANGE UP (ref 12–78)
ANION GAP SERPL CALC-SCNC: 7 MMOL/L — SIGNIFICANT CHANGE UP (ref 5–17)
AST SERPL-CCNC: 16 U/L — SIGNIFICANT CHANGE UP (ref 15–37)
BASOPHILS # BLD AUTO: 0.03 K/UL — SIGNIFICANT CHANGE UP (ref 0–0.2)
BASOPHILS NFR BLD AUTO: 0.5 % — SIGNIFICANT CHANGE UP (ref 0–2)
BILIRUB SERPL-MCNC: 1.1 MG/DL — SIGNIFICANT CHANGE UP (ref 0.2–1.2)
BUN SERPL-MCNC: 20 MG/DL — SIGNIFICANT CHANGE UP (ref 7–23)
CALCIUM SERPL-MCNC: 9.3 MG/DL — SIGNIFICANT CHANGE UP (ref 8.5–10.1)
CHLORIDE SERPL-SCNC: 107 MMOL/L — SIGNIFICANT CHANGE UP (ref 96–108)
CO2 SERPL-SCNC: 27 MMOL/L — SIGNIFICANT CHANGE UP (ref 22–31)
CREAT SERPL-MCNC: 0.82 MG/DL — SIGNIFICANT CHANGE UP (ref 0.5–1.3)
EGFR: 77 ML/MIN/1.73M2 — SIGNIFICANT CHANGE UP
EOSINOPHIL # BLD AUTO: 0.07 K/UL — SIGNIFICANT CHANGE UP (ref 0–0.5)
EOSINOPHIL NFR BLD AUTO: 1.1 % — SIGNIFICANT CHANGE UP (ref 0–6)
FLUAV AG NPH QL: SIGNIFICANT CHANGE UP
FLUBV AG NPH QL: SIGNIFICANT CHANGE UP
GLUCOSE SERPL-MCNC: 104 MG/DL — HIGH (ref 70–99)
HCT VFR BLD CALC: 47.5 % — HIGH (ref 34.5–45)
HGB BLD-MCNC: 14.6 G/DL — SIGNIFICANT CHANGE UP (ref 11.5–15.5)
IMM GRANULOCYTES NFR BLD AUTO: 0.3 % — SIGNIFICANT CHANGE UP (ref 0–0.9)
INR BLD: 1.1 RATIO — SIGNIFICANT CHANGE UP (ref 0.88–1.16)
LYMPHOCYTES # BLD AUTO: 1.89 K/UL — SIGNIFICANT CHANGE UP (ref 1–3.3)
LYMPHOCYTES # BLD AUTO: 29.2 % — SIGNIFICANT CHANGE UP (ref 13–44)
MAGNESIUM SERPL-MCNC: 2.2 MG/DL — SIGNIFICANT CHANGE UP (ref 1.6–2.6)
MCHC RBC-ENTMCNC: 26.4 PG — LOW (ref 27–34)
MCHC RBC-ENTMCNC: 30.7 G/DL — LOW (ref 32–36)
MCV RBC AUTO: 85.7 FL — SIGNIFICANT CHANGE UP (ref 80–100)
MONOCYTES # BLD AUTO: 0.37 K/UL — SIGNIFICANT CHANGE UP (ref 0–0.9)
MONOCYTES NFR BLD AUTO: 5.7 % — SIGNIFICANT CHANGE UP (ref 2–14)
NEUTROPHILS # BLD AUTO: 4.1 K/UL — SIGNIFICANT CHANGE UP (ref 1.8–7.4)
NEUTROPHILS NFR BLD AUTO: 63.2 % — SIGNIFICANT CHANGE UP (ref 43–77)
NRBC # BLD: 0 /100 WBCS — SIGNIFICANT CHANGE UP (ref 0–0)
NT-PROBNP SERPL-SCNC: 132 PG/ML — HIGH (ref 0–125)
PLATELET # BLD AUTO: 234 K/UL — SIGNIFICANT CHANGE UP (ref 150–400)
POTASSIUM SERPL-MCNC: 3.9 MMOL/L — SIGNIFICANT CHANGE UP (ref 3.5–5.3)
POTASSIUM SERPL-SCNC: 3.9 MMOL/L — SIGNIFICANT CHANGE UP (ref 3.5–5.3)
PROT SERPL-MCNC: 7.6 GM/DL — SIGNIFICANT CHANGE UP (ref 6–8.3)
PROTHROM AB SERPL-ACNC: 13.1 SEC — SIGNIFICANT CHANGE UP (ref 10.5–13.4)
RBC # BLD: 5.54 M/UL — HIGH (ref 3.8–5.2)
RBC # FLD: 14.1 % — SIGNIFICANT CHANGE UP (ref 10.3–14.5)
SARS-COV-2 RNA SPEC QL NAA+PROBE: SIGNIFICANT CHANGE UP
SODIUM SERPL-SCNC: 141 MMOL/L — SIGNIFICANT CHANGE UP (ref 135–145)
WBC # BLD: 6.48 K/UL — SIGNIFICANT CHANGE UP (ref 3.8–10.5)
WBC # FLD AUTO: 6.48 K/UL — SIGNIFICANT CHANGE UP (ref 3.8–10.5)

## 2022-11-22 PROCEDURE — 99284 EMERGENCY DEPT VISIT MOD MDM: CPT

## 2022-11-22 PROCEDURE — 93010 ELECTROCARDIOGRAM REPORT: CPT

## 2022-11-22 PROCEDURE — 71046 X-RAY EXAM CHEST 2 VIEWS: CPT | Mod: 26

## 2022-11-22 RX ADMIN — Medication 125 MILLIGRAM(S): at 09:20

## 2022-11-22 NOTE — ED ADULT TRIAGE NOTE - CHIEF COMPLAINT QUOTE
c/o cough and chest congestion x3 weeks. pt completed Z-pack treatment on Saturday. pt states she woke up this morning shortness of breath

## 2022-11-22 NOTE — ED PROVIDER NOTE - CLINICAL SUMMARY MEDICAL DECISION MAKING FREE TEXT BOX
chest tightness. likely asthma, will put on steroids for 1 week. no wheezing in the ed and lungs sound clear. chest tightness. likely asthma, will put on steroids for 1 week. no wheezing in the ed and lungs sound clear.  I read ekg as sinus rhys rate 55, no st elevation or depression, qtc 403, narrow qrs, normal axis, left atrial enlargement. chest tightness. likely asthma, will put on steroids for 1 week. no wheezing in the ed and lungs sound clear.  I read ekg as sinus rhys rate 55, no st elevation or depression, qtc 403, narrow qrs, normal axis, left atrial enlargement.  xray reassuring. labs reassuring. ok for dc home.

## 2022-11-22 NOTE — ED PROVIDER NOTE - PATIENT PORTAL LINK FT
You can access the FollowMyHealth Patient Portal offered by [facility] by registering at the following website: [email or web address]. By joining [facility]’s FollowMyHealth portal, you will also be able to view your health information using other applications (apps) compatible with our system.

## 2022-11-22 NOTE — ED PROVIDER NOTE - NSFOLLOWUPINSTRUCTIONS_ED_ALL_ED_FT
Take the steroids daily as prescribed for 5 days.    Take the albuterol every 4 hours for the next 2 days.    After that, use only as needed for wheezing or chest tightness.

## 2022-11-22 NOTE — ED ADULT NURSE NOTE - OBJECTIVE STATEMENT
67yF came in by private car alert and oriented x 4 c/o chest congestion and dry cough that has worsened in the past week. pt reports that she has been sick for about 3 weeks and completed the z pack dose this past weekend. pt denies fever, chest pain. pt reports chief complaint of chest congestion and right upper back pain that does not radiate elsewhere. pt hx of asthma, lt knee rep, elevated cholesterol, kidney stones, fatty liver.

## 2022-11-22 NOTE — ED PROVIDER NOTE - OBJECTIVE STATEMENT
67F hx asthma, gerd pw 2 wks cough, chest tightness and congestion. on albuterol prn and symbicort daily. no fevers. s/p zpack finished 3 days ago. at the moment, pt feeling fine. ros neg for ha, vis loss, rhinorrhea, cp, sob, abd pn, vomiting, rash, bleeding, dysuria, back pain, anxiety.

## 2022-11-22 NOTE — ED ADULT NURSE NOTE - NSICDXPASTMEDICALHX_GEN_ALL_CORE_FT
PAST MEDICAL HISTORY:  Asthma     COVID-19 vaccine series completed 3/31/2021    Osteoarthritis of left knee     Seasonal allergies     Seasonal allergies     Sinusitis, chronic

## 2022-11-28 ENCOUNTER — APPOINTMENT (OUTPATIENT)
Dept: ORTHOPEDIC SURGERY | Facility: CLINIC | Age: 67
End: 2022-11-28

## 2022-11-28 VITALS — HEIGHT: 65 IN | WEIGHT: 168 LBS | BODY MASS INDEX: 27.99 KG/M2

## 2022-11-28 PROCEDURE — 72110 X-RAY EXAM L-2 SPINE 4/>VWS: CPT

## 2022-11-28 PROCEDURE — 72040 X-RAY EXAM NECK SPINE 2-3 VW: CPT

## 2022-11-28 PROCEDURE — 72170 X-RAY EXAM OF PELVIS: CPT

## 2022-11-28 PROCEDURE — 99204 OFFICE O/P NEW MOD 45 MIN: CPT

## 2022-11-28 RX ORDER — MELOXICAM 7.5 MG/1
7.5 TABLET ORAL
Qty: 60 | Refills: 1 | Status: ACTIVE | COMMUNITY
Start: 2022-11-28 | End: 1900-01-01

## 2022-11-28 NOTE — HISTORY OF PRESENT ILLNESS
[Neck] : neck [Lower back] : lower back [Gradual] : gradual [2] : 2 [3] : 3 [Dull/Aching] : dull/aching [Constant] : constant [Retired] : Work status: retired [de-identified] : 11/28/22:  66 yo F with total spine issues - RHD - has mild chronic neck pain - shoots down the left arm - tingling into the left arm \par \par lower back pain which at times shoots down the leg - uses cane \par \par Had MRi done in 2013 which showed cord compression \par Had CT scan of the sinus done recently which showed cord compession\par \par Had a left sided TKA which is still still \par borderline DM\par borderilne HTN\par asthma - RECENTly HAD to go to the ER for this \par No hx of caner \par Has bladder leakage\par \par Has tried chiro years ago \par No prior surgery of the spine \par Uses OTC meds for this \par \par xrays today:\par C spine - advanced spondylosis, collapse of the disc space \par L spine - mild spondylosis \par AP PELVIS - NEGATIVE  [] : no [FreeTextEntry5] : pt is 67 years old female who present evaluation of the cervical spine& lumber spine pt states she has  Herniated disc

## 2022-11-28 NOTE — DISCUSSION/SUMMARY
[de-identified] : reviewed the old  reports and the new xrays \par has reports with cervical stenosis from years ago \par indicated for updated MRI C spine \par indicated for updated MRI L spine \par fu to review the MRIs \par \par mobic \par

## 2022-12-21 ENCOUNTER — APPOINTMENT (OUTPATIENT)
Dept: ORTHOPEDIC SURGERY | Facility: CLINIC | Age: 67
End: 2022-12-21

## 2022-12-21 VITALS — WEIGHT: 168 LBS | BODY MASS INDEX: 28.68 KG/M2 | HEIGHT: 64 IN

## 2022-12-21 DIAGNOSIS — Z96.652 PRESENCE OF LEFT ARTIFICIAL KNEE JOINT: ICD-10-CM

## 2022-12-21 PROCEDURE — 99213 OFFICE O/P EST LOW 20 MIN: CPT

## 2022-12-21 NOTE — DISCUSSION/SUMMARY
[de-identified] : Impression left total knee replacement Possible mild patellar clunk syndrome possible flexion instability\par Plan MRI left knee\par

## 2022-12-21 NOTE — HISTORY OF PRESENT ILLNESS
[de-identified] : 68y/o female presents for follow-up of left knee. She is s/p left total knee replacement June 2021. Patient complains of chronic "pinching discomfort" lateral posterolateral aspect left knee crepitus swelling.  Overall she does not experience a "a lot of pain".  She requires use of a banister going up stairs because of "weakness".  She is experiencing increasing symptoms secondary to right knee osteoarthritis.

## 2022-12-21 NOTE — PHYSICAL EXAM
[de-identified] : Constitutional:Well nourished , well developed and in no acute distress\par Psychiatric: Alert and oriented to time place and person.Appropriate affect\par Respiratory: Unlabored respirations,no audible wheezing\par Cardiovascular: no leg swelling ankle edema\par Vascular: no calf or thigh tenderness, \par Peripheral pulses; intact\par Skin:Head, neck, arms and lower extremities:no lesions or discoloration \par Lymphatics:No groin adenopathy\par Neurological: intact light touch sensation and grossly intact coordination and motor power.\par Knee; left incision healed no effusion flexion 120 ligaments Previously palpable snapping crepitus left knee on terminal extension no longer present Mild mid range varus laxity mildly positive flexion distraction  [de-identified] : Prior x-rays left knee reveal satisfactory left total knee component alignment

## 2023-01-04 ENCOUNTER — APPOINTMENT (OUTPATIENT)
Dept: ORTHOPEDIC SURGERY | Facility: CLINIC | Age: 68
End: 2023-01-04
Payer: MEDICARE

## 2023-01-04 DIAGNOSIS — M47.812 SPONDYLOSIS W/OUT MYELOPATHY OR RADICULOPATHY, CERVICAL REGION: ICD-10-CM

## 2023-01-04 DIAGNOSIS — M54.16 RADICULOPATHY, LUMBAR REGION: ICD-10-CM

## 2023-01-04 DIAGNOSIS — M47.816 SPONDYLOSIS W/OUT MYELOPATHY OR RADICULOPATHY, LUMBAR REGION: ICD-10-CM

## 2023-01-04 DIAGNOSIS — M54.12 RADICULOPATHY, CERVICAL REGION: ICD-10-CM

## 2023-01-04 DIAGNOSIS — M50.20 OTHER CERVICAL DISC DISPLACEMENT, UNSPECIFIED CERVICAL REGION: ICD-10-CM

## 2023-01-04 DIAGNOSIS — M48.02 SPINAL STENOSIS, CERVICAL REGION: ICD-10-CM

## 2023-01-04 PROCEDURE — 99214 OFFICE O/P EST MOD 30 MIN: CPT

## 2023-01-04 NOTE — HISTORY OF PRESENT ILLNESS
[Neck] : neck [Lower back] : lower back [Gradual] : gradual [2] : 2 [3] : 3 [Dull/Aching] : dull/aching [Constant] : constant [Retired] : Work status: retired [de-identified] : 11/28/22:  66 yo F with total spine issues - RHD - has mild chronic neck pain - shoots down the left arm - tingling into the left arm \par \par lower back pain which at times shoots down the leg - uses cane \par \par Had MRi done in 2013 which showed cord compression \par Had CT scan of the sinus done recently which showed cord compession\par \par Had a left sided TKA which is still still \par borderline DM\par borderilne HTN\par asthma - RECENTly HAD to go to the ER for this \par No hx of caner \par Has bladder leakage\par \par retired \par \par Has tried chiro years ago \par No prior surgery of the spine \par Uses OTC meds for this \par \par xrays today:\par C spine - advanced spondylosis, collapse of the disc space \par L spine - mild spondylosis \par AP PELVIS - NEGATIVE \par \par 01/04/23 here to review mri result on the cervical and lower spine - plan at last was "reviewed the old  reports and the new xrays \par has reports with cervical stenosis from years ago \par indicated for updated MRI C spine \par indicated for updated MRI L spine \par fu to review the MRIs \par \par mobic" - overall the neck remains painful - arms feel weak and numb at time - neck pain is worse than the lower back \par \par Lower back pain remains - at times into the hip \par \par Has seen chiro in the past - ibuprofen \par \par MRI C spine - Preserved cervical vertebral body heights and alignment without acute bony pathology. No abnormal cervical cord signal.\par Straightening of the normal cervical lordosis.\par There is mild-moderate spinal canal stenosis at C3-C4, C4-C5 and C5-C6 mild spinal canal stenosis at C6-C7 and C7-T1, with minimal impression upon the ventral cervical cord at C3-C4 and C5-C6 and minimal flattening of the ventral cervical cord at C4-C5, C6-C7 and C7-T1.\par Severe bilateral foraminal stenosis at C3-C4 through C7-T1 with impression upon the respective bilateral exiting nerve roots.\par Uncovertebral joint hypertrophy, degenerative endplate irregularity and moderate-severe endplate spondylosis at C3-C4 through C7-T1.\par Moderate-severe degenerative disc disease at C3-C4 through C7-T1. Multilevel disc bulges and minimal-small posterior disc protrusions\par \par MRI L spine - Since L-spine MRI of 7/25/11:\par Preserved lumbar vertebral body heights without acute bony pathology. No spinal canal stenosis. No cauda equina compression.\par New slight grade 1 anterolisthesis of L4 upon L5, without spondylolysis.\par L5-S1: Worsening moderate-severe bilateral foraminal stenosis greater on left with worsening impingement upon the exiting left and impression upon the exiting right L5 nerve root secondary to increased size of annular disc bulge and worsening bilateral foraminal osteophytes at disc complexes and chronic facet joint hypertrophy.\par L4-5: New minimal impression upon the ventral thecal sac and worsening mild-moderate bilateral foraminal stenosis secondary to new slight grade 1 anterolisthesis, small annular disc bulge and chronic facet joint hypertrophy.\par L2-L3: Worsening mild-moderate left and mild right foraminal stenosis with abutment upon the far exiting left L2 nerve root secondary to increase in size of small annular disc bulge with chronic small left foraminal broad-based disc protrusion.\par L1-L2: New mild right foraminal stenosis secondary to new minimal disc bulge and shallow right foraminal broad-based disc protrusion.\par L3-L4: Chronic minimal impression upon the ventral thecal sac and moderate left and mild right foraminal stenosis with abutment upon the exiting left L3 nerve root.\par Mild worsening of degenerative changes greatest at L5-S1 with new inflammatory endplate degenerative changes at L5-S1.\par Worsening mild degenerative disc disease at L4-5 and L5-S1 and chronic at L2-L3 and L3-4.\par T11-T12: Partially imaged new moderate spinal canal stenosis with 4 impression upon the thoracic cord secondary to annular disc bulge and facet joint hypertrophy with ligament of flavum redundancy. Thoracic spine MRI is advised for further evaluation. [] : no [FreeTextEntry5] : pt is 67 years old female who present evaluation of the cervical spine& lumber spine pt states she has  Herniated disc [de-identified] : mris done at [facility] radiologist

## 2023-01-04 NOTE — DATA REVIEWED
[MRI] : MRI [Cervical Spine] : cervical spine [Lumbar Spine] : lumbar spine [Report was reviewed and noted in the chart] : The report was reviewed and noted in the chart [I independently reviewed and interpreted images and report] : I independently reviewed and interpreted images and report

## 2023-01-04 NOTE — DISCUSSION/SUMMARY
[de-identified] : reviewed the MRis with her \par discussion of tx optoins \par has degnerative changes in the neck and the lower back \par would theoretically be a candidate for surgery but rec she try conservative tx \par PT \par referral to pain management \par

## 2023-01-30 ENCOUNTER — APPOINTMENT (OUTPATIENT)
Dept: ORTHOPEDIC SURGERY | Facility: CLINIC | Age: 68
End: 2023-01-30
Payer: MEDICARE

## 2023-01-30 VITALS — WEIGHT: 168 LBS | BODY MASS INDEX: 27.99 KG/M2 | HEIGHT: 65 IN

## 2023-01-30 DIAGNOSIS — M17.11 UNILATERAL PRIMARY OSTEOARTHRITIS, RIGHT KNEE: ICD-10-CM

## 2023-01-30 PROCEDURE — 99215 OFFICE O/P EST HI 40 MIN: CPT

## 2023-01-30 PROCEDURE — 73564 X-RAY EXAM KNEE 4 OR MORE: CPT | Mod: RT

## 2023-01-30 NOTE — HISTORY OF PRESENT ILLNESS
[Gradual] : gradual [8] : 8 [4] : 4 [Dull/Aching] : dull/aching [Rest] : rest [de-identified] : 67F p/w R knee pain. Has had pain for many years, she has tried PT and injections in the past with temporary relief. She says the pain has been worsening steadily. Makes walking long distances difficult, trouble with stairs. [] : no [FreeTextEntry1] : right knee  [FreeTextEntry5] :  NEETA CORDEROJOSSYSIRIA is a 67 year female who is here today for Right knee pain, states she has been having pain for years and pain has been getting progressively worse.  [FreeTextEntry7] : down the leg [de-identified] : activities

## 2023-01-30 NOTE — PHYSICAL EXAM
[Normal Sensation] : normal sensation [Normal Mood and Affect] : normal mood and affect [Orientated] : orientated [Able to Communicate] : able to communicate [Well Developed] : well developed [Well Nourished] : well nourished [5___] : hamstring 5[unfilled]/5 [] : patient ambulates without assistive device [Right] : right knee [advanced tricompartmental OA with medial compartment narrowing and varus alignment] : advanced tricompartmental OA with medial compartment narrowing and varus alignment [TWNoteComboBox7] : flexion 120 degrees [de-identified] : extension 10 degrees

## 2023-01-30 NOTE — ASSESSMENT
[FreeTextEntry1] : 67F p/w adv R knee OA\par \par SHe would like to proceed R TKA, r b a explained to patient, we will call to schedule\par \par We discussed my findings and the natural history of their condition. We talked about the details of the proposed surgery and the recovery. We discussed the material risks, possible benefits and alternatives to surgery. The risks include but are not limited to infection, bleeding and possible need for blood transfusion, fracture, bowel blockage, bladder retention or infection, need for reoperation, stiffness and/or limited range of motion, possible damage to nerves and blood vessels, failure of fixation of components, risk of deep vein thromboses and pulmonary embolism, wound healing problems, dislocation, and possible leg length discrepancy. Although incredibly rare, we also discussed the risks of a cardiac event, stroke and even death during, or following, the surgery. We discussed the type of implants the patient will be receiving and the type of fixation that will be used, as well as whether a robot or computer navigation aide will be used. The patient understands they will need medical clearance and will attend a preoperative joint education class. We also discussed the type of anesthesia they will receive, and the risks associated with hospital or rehab length of stay, obesity, diabetes and smoking.\par

## 2023-02-22 ENCOUNTER — APPOINTMENT (OUTPATIENT)
Dept: MRI IMAGING | Facility: CLINIC | Age: 68
End: 2023-02-22

## 2023-03-28 ENCOUNTER — FORM ENCOUNTER (OUTPATIENT)
Age: 68
End: 2023-03-28

## 2023-04-10 ENCOUNTER — FORM ENCOUNTER (OUTPATIENT)
Age: 68
End: 2023-04-10

## 2023-04-17 ENCOUNTER — OUTPATIENT (OUTPATIENT)
Dept: OUTPATIENT SERVICES | Facility: HOSPITAL | Age: 68
LOS: 1 days | Discharge: HOME | End: 2023-04-17
Payer: MEDICARE

## 2023-04-17 VITALS
WEIGHT: 175.27 LBS | DIASTOLIC BLOOD PRESSURE: 75 MMHG | TEMPERATURE: 98 F | OXYGEN SATURATION: 99 % | HEIGHT: 64 IN | RESPIRATION RATE: 16 BRPM | HEART RATE: 57 BPM | SYSTOLIC BLOOD PRESSURE: 125 MMHG

## 2023-04-17 DIAGNOSIS — Z98.51 TUBAL LIGATION STATUS: Chronic | ICD-10-CM

## 2023-04-17 DIAGNOSIS — H40.9 UNSPECIFIED GLAUCOMA: ICD-10-CM

## 2023-04-17 DIAGNOSIS — Z01.818 ENCOUNTER FOR OTHER PREPROCEDURAL EXAMINATION: ICD-10-CM

## 2023-04-17 DIAGNOSIS — J45.909 UNSPECIFIED ASTHMA, UNCOMPLICATED: ICD-10-CM

## 2023-04-17 DIAGNOSIS — Z96.652 PRESENCE OF LEFT ARTIFICIAL KNEE JOINT: Chronic | ICD-10-CM

## 2023-04-17 DIAGNOSIS — M17.11 UNILATERAL PRIMARY OSTEOARTHRITIS, RIGHT KNEE: ICD-10-CM

## 2023-04-17 DIAGNOSIS — Z01.812 ENCOUNTER FOR PREPROCEDURAL LABORATORY EXAMINATION: ICD-10-CM

## 2023-04-17 LAB
A1C WITH ESTIMATED AVERAGE GLUCOSE RESULT: 6.1 % — HIGH (ref 4–5.6)
ALBUMIN SERPL ELPH-MCNC: 3.5 G/DL — SIGNIFICANT CHANGE UP (ref 3.3–5)
ALP SERPL-CCNC: 149 U/L — HIGH (ref 40–120)
ALT FLD-CCNC: 21 U/L — SIGNIFICANT CHANGE UP (ref 12–78)
ANION GAP SERPL CALC-SCNC: 1 MMOL/L — LOW (ref 5–17)
APTT BLD: 32.1 SEC — SIGNIFICANT CHANGE UP (ref 27.5–35.5)
AST SERPL-CCNC: 17 U/L — SIGNIFICANT CHANGE UP (ref 15–37)
BASOPHILS # BLD AUTO: 0.04 K/UL — SIGNIFICANT CHANGE UP (ref 0–0.2)
BASOPHILS NFR BLD AUTO: 0.9 % — SIGNIFICANT CHANGE UP (ref 0–2)
BILIRUB SERPL-MCNC: 1.1 MG/DL — SIGNIFICANT CHANGE UP (ref 0.2–1.2)
BLD GP AB SCN SERPL QL: SIGNIFICANT CHANGE UP
BUN SERPL-MCNC: 15 MG/DL — SIGNIFICANT CHANGE UP (ref 7–23)
CALCIUM SERPL-MCNC: 9.6 MG/DL — SIGNIFICANT CHANGE UP (ref 8.5–10.1)
CHLORIDE SERPL-SCNC: 112 MMOL/L — HIGH (ref 96–108)
CO2 SERPL-SCNC: 30 MMOL/L — SIGNIFICANT CHANGE UP (ref 22–31)
CREAT SERPL-MCNC: 0.79 MG/DL — SIGNIFICANT CHANGE UP (ref 0.5–1.3)
EGFR: 81 ML/MIN/1.73M2 — SIGNIFICANT CHANGE UP
EOSINOPHIL # BLD AUTO: 0.09 K/UL — SIGNIFICANT CHANGE UP (ref 0–0.5)
EOSINOPHIL NFR BLD AUTO: 1.9 % — SIGNIFICANT CHANGE UP (ref 0–6)
ESTIMATED AVERAGE GLUCOSE: 128 MG/DL — HIGH (ref 68–114)
GLUCOSE SERPL-MCNC: 99 MG/DL — SIGNIFICANT CHANGE UP (ref 70–99)
HCT VFR BLD CALC: 46.5 % — HIGH (ref 34.5–45)
HGB BLD-MCNC: 14.5 G/DL — SIGNIFICANT CHANGE UP (ref 11.5–15.5)
IMM GRANULOCYTES NFR BLD AUTO: 0.4 % — SIGNIFICANT CHANGE UP (ref 0–0.9)
INR BLD: 1.09 RATIO — SIGNIFICANT CHANGE UP (ref 0.88–1.16)
LYMPHOCYTES # BLD AUTO: 1.71 K/UL — SIGNIFICANT CHANGE UP (ref 1–3.3)
LYMPHOCYTES # BLD AUTO: 36.5 % — SIGNIFICANT CHANGE UP (ref 13–44)
MCHC RBC-ENTMCNC: 26.3 PG — LOW (ref 27–34)
MCHC RBC-ENTMCNC: 31.2 G/DL — LOW (ref 32–36)
MCV RBC AUTO: 84.4 FL — SIGNIFICANT CHANGE UP (ref 80–100)
MONOCYTES # BLD AUTO: 0.39 K/UL — SIGNIFICANT CHANGE UP (ref 0–0.9)
MONOCYTES NFR BLD AUTO: 8.3 % — SIGNIFICANT CHANGE UP (ref 2–14)
MRSA PCR RESULT.: SIGNIFICANT CHANGE UP
NEUTROPHILS # BLD AUTO: 2.43 K/UL — SIGNIFICANT CHANGE UP (ref 1.8–7.4)
NEUTROPHILS NFR BLD AUTO: 52 % — SIGNIFICANT CHANGE UP (ref 43–77)
NRBC # BLD: 0 /100 WBCS — SIGNIFICANT CHANGE UP (ref 0–0)
PLATELET # BLD AUTO: 228 K/UL — SIGNIFICANT CHANGE UP (ref 150–400)
POTASSIUM SERPL-MCNC: 4.3 MMOL/L — SIGNIFICANT CHANGE UP (ref 3.5–5.3)
POTASSIUM SERPL-SCNC: 4.3 MMOL/L — SIGNIFICANT CHANGE UP (ref 3.5–5.3)
PROT SERPL-MCNC: 7.2 GM/DL — SIGNIFICANT CHANGE UP (ref 6–8.3)
PROTHROM AB SERPL-ACNC: 13.1 SEC — SIGNIFICANT CHANGE UP (ref 10.5–13.4)
RBC # BLD: 5.51 M/UL — HIGH (ref 3.8–5.2)
RBC # FLD: 13.6 % — SIGNIFICANT CHANGE UP (ref 10.3–14.5)
S AUREUS DNA NOSE QL NAA+PROBE: SIGNIFICANT CHANGE UP
SODIUM SERPL-SCNC: 143 MMOL/L — SIGNIFICANT CHANGE UP (ref 135–145)
VIT D25+D1,25 OH+D1,25 PNL SERPL-MCNC: 73.4 PG/ML — SIGNIFICANT CHANGE UP (ref 19.9–79.3)
WBC # BLD: 4.68 K/UL — SIGNIFICANT CHANGE UP (ref 3.8–10.5)
WBC # FLD AUTO: 4.68 K/UL — SIGNIFICANT CHANGE UP (ref 3.8–10.5)

## 2023-04-17 PROCEDURE — 93010 ELECTROCARDIOGRAM REPORT: CPT

## 2023-04-17 RX ORDER — DIPHENHYDRAMINE HCL 50 MG
1 CAPSULE ORAL
Qty: 0 | Refills: 0 | DISCHARGE

## 2023-04-17 RX ORDER — SODIUM CHLORIDE 9 MG/ML
3 INJECTION INTRAMUSCULAR; INTRAVENOUS; SUBCUTANEOUS EVERY 8 HOURS
Refills: 0 | Status: DISCONTINUED | OUTPATIENT
Start: 2023-05-03 | End: 2023-05-04

## 2023-04-17 NOTE — H&P PST ADULT - ASSESSMENT
66 y/o F PMH glaucoma, gerd, asthma ( denies intubation) presents to PST for scheduled right total knee replacement on 5/3/23 with Dr.Munn CAPRINI SCORE [CLOT]    AGE RELATED RISK FACTORS                                                       MOBILITY RELATED FACTORS  [ ] Age 41-60 years                                            (1 Point)                  [ ] Bed rest                                                        (1 Point)  [ x] Age: 61-74 years                                           (2 Points)                 [ ] Plaster cast                                                   (2 Points)  [ ] Age= 75 years                                              (3 Points)                 [ ] Bed bound for more than 72 hours                 (2 Points)    DISEASE RELATED RISK FACTORS                                               GENDER SPECIFIC FACTORS  [ ] Edema in the lower extremities                       (1 Point)                  [ ] Pregnancy                                                     (1 Point)  [ ] Varicose veins                                               (1 Point)                  [ ] Post-partum < 6 weeks                                   (1 Point)             [ ] BMI > 25 Kg/m2                                            (1 Point)                  [ ] Hormonal therapy  or oral contraception          (1 Point)                 [ ] Sepsis (in the previous month)                        (1 Point)                  [ ] History of pregnancy complications                 (1 point)  [ ] Pneumonia or serious lung disease                                               [ ] Unexplained or recurrent                     (1 Point)           (in the previous month)                               (1 Point)  [ ] Abnormal pulmonary function test                     (1 Point)                 SURGERY RELATED RISK FACTORS  [ ] Acute myocardial infarction                              (1 Point)                 [ ]  Section                                             (1 Point)  [ ] Congestive heart failure (in the previous month)  (1 Point)               [ ] Minor surgery                                                  (1 Point)   [ ] Inflammatory bowel disease                             (1 Point)                 [ ] Arthroscopic surgery                                        (2 Points)  [ ] Central venous access                                      (2 Points)                [ ] General surgery lasting more than 45 minutes   (2 Points)       [ ] Stroke (in the previous month)                          (5 Points)               [ x] Elective arthroplasty                                         (5 Points)                                                                                                                                               HEMATOLOGY RELATED FACTORS                                                 TRAUMA RELATED RISK FACTORS  [ ] Prior episodes of VTE                                     (3 Points)                [ ] Fracture of the hip, pelvis, or leg                       (5 Points)  [ ] Positive family history for VTE                         (3 Points)                 [ ] Acute spinal cord injury (in the previous month)  (5 Points)  [ ] Prothrombin 58009 A                                     (3 Points)                 [ ] Paralysis  (less than 1 month)                             (5 Points)  [ ] Factor V Leiden                                             (3 Points)                  [ ] Multiple Trauma within 1 month                        (5 Points)  [ ] Lupus anticoagulants                                     (3 Points)                                                           [ ] Anticardiolipin antibodies                               (3 Points)                                                       [ ] High homocysteine in the blood                      (3 Points)                                             [ ] Other congenital or acquired thrombophilia      (3 Points)                                                [ ] Heparin induced thrombocytopenia                  (3 Points)                                          Total Score [     7     ]    Caprini Score 0 - 2:  Low Risk, No VTE Prophylaxis required for most patients, encourage ambulation  Caprini Score 3 - 6:  At Risk, pharmacologic VTE prophylaxis is indicated for most patients (in the absence of a contraindication)  Caprini Score Greater than or = 7:  High Risk, pharmacologic VTE prophylaxis is indicated for most patients (in the absence of a contraindication)

## 2023-04-17 NOTE — H&P PST ADULT - HISTORY OF PRESENT ILLNESS
66 yo female reports longstanging left knee pain with swelling rating 10/10 when she is weight bearing, stair climbing.  She is scheduled for left total knee replacement on 5/27/2021 at [facility].   66 y/o F PMH glaucoma, gerd, asthma ( denies intubation) presents to PST for scheduled right total knee replacement on 5/3/23 with     This patient denies any fever, cough, sob, flu like symptoms or travel outside of the [place] in the past 30 days     goal: to walk without pain

## 2023-04-17 NOTE — OCCUPATIONAL THERAPY INITIAL EVALUATION ADULT - RANGE OF MOTION EXAMINATION, LOWER EXTREMITY
ROM is limited in right knee due to pain/Left LE Active ROM was WFL (within functional limits)/Left LE Passive ROM was WFL (w/i functional limits)/Right LE Active ROM was WFL   (within functional limits)/Right LE Passive ROM was WFL  (within functional limits)

## 2023-04-17 NOTE — OCCUPATIONAL THERAPY INITIAL EVALUATION ADULT - ADDITIONAL COMMENTS
At this time, pt  is functioning in her roles, self sufficient, driving & ambulating independently at home and in the community with single axis cane. Pt owns a rolling walker and 3:1 commode. Pt has an antalgic gait. Pt c/o 2/10 pain in her right knee ar rest and 8/10 at worse. The pain is exacerbated, by walking, prolonged standing, negotiating steps and is relieved with Motrin or Advil. Pt is right hand dominant and wears glasses for reading.

## 2023-04-17 NOTE — H&P PST ADULT - PROBLEM SELECTOR PLAN 1
Labs-CBC, BMP, PT/INR, PTT ,T&S, Nose Cx, EKG   M/C required    Preop Hibiclens x 3 day instructions reviewed and given. Instructed on if Cx is positive use Mupirocin 5 days and checklist given in booklet   Take routine meds DOS with small sips of water, avoid NSAIDs and OTC supplements, verbalized understanding information on proper nutrition, increase protein and better food choices provided in booklet.    Ensure clear not given 2/2 hx pre-DM  Pt aware COVID-19 PCR test needed 3-5 days prior to surgery   Anesthesiologist to review PST labs, EKG, required clearances, and optimization for surgery

## 2023-04-17 NOTE — H&P PST ADULT - NSICDXFAMILYHX_GEN_ALL_CORE_FT
FAMILY HISTORY:  No pertinent family history in first degree relatives FAMILY HISTORY:  No pertinent family history in first degree relatives  No pertinent family history in first degree relatives

## 2023-04-17 NOTE — OCCUPATIONAL THERAPY INITIAL EVALUATION ADULT - LIVES WITH, PROFILE
in a private house with 5-6 steps to enter with no handrails, but  wide enough to fit a rolling walker . Once inside, pt has to negotiate 8-10 step with close bilateral handrail access all living amenities are located on one level. The bathroom has a walk in shower, fixed / retractable shower head and standard toilet with adequate space to fit a commode over it./spouse in a private house with 5-6 steps to enter with no handrails, but wide enough to fit a rolling walker . Once inside, pt has to negotiate 8-10 step with close bilateral handrail access all living amenities are located on one level. The bathroom has a walk in shower, fixed / retractable shower head and standard toilet with adequate space to fit a commode over it./spouse

## 2023-04-17 NOTE — OCCUPATIONAL THERAPY INITIAL EVALUATION ADULT - PERTINENT HX OF CURRENT PROBLEM, REHAB EVAL
Pt is a 66 y/o female slated for elective surgery for right TKR with MD Cavazos on 5/3/2023, due to OA, chronic pain and DJD. Pt has left TKR in June 2020 and was discharged home with rehab services. Pt reported buckling in her right knee, recent falls in the past 3-6 months. Pt got up with difficulty and luckily did not sustain any injuries that necessitated medical attention. Pt is a 68 y/o female slated for elective surgery for right TKR with MD Cavazos on 5/3/2023, due to OA, chronic pain and DJD. Pt had left TKR in June 2020 and was discharged home with rehab services. Pt reported buckling in her right knee, recent falls in the past 3-6 months. Pt got up with difficulty and luckily did not sustain any injuries that necessitated medical attention.

## 2023-04-17 NOTE — OCCUPATIONAL THERAPY INITIAL EVALUATION ADULT - SOCIAL CONCERNS
Pt voiced concerns about her recovery at home and the change of renting a CMP machine due to unsatisfactory results of left TKR. Pt endorsed that her spouse will be able to assist her after discharged home post-operatively. ./Complex psychosocial needs/coping issues Pt voiced concerns about her recovery at home and the chance of renting a CMP machine due to unsatisfactory results of left TKR. Pt endorsed that her spouse will be able to assist her after discharged home post-operatively. ./Complex psychosocial needs/coping issues

## 2023-04-17 NOTE — H&P PST ADULT - NSICDXPASTMEDICALHX_GEN_ALL_CORE_FT
PAST MEDICAL HISTORY:  Asthma     COVID-19 vaccine series completed 3/31/2021    Osteoarthritis of left knee     Seasonal allergies      PAST MEDICAL HISTORY:  Asthma     COVID-19 vaccine series completed 3/31/2021    Osteoarthritis of left knee     Seasonal allergies     Seasonal allergies     Sinusitis, chronic      PAST MEDICAL HISTORY:  Asthma     COVID-19 vaccine series completed 3/31/2021    Glaucoma     Osteoarthritis of left knee     Seasonal allergies     Seasonal allergies     Sinusitis, chronic

## 2023-04-17 NOTE — OCCUPATIONAL THERAPY INITIAL EVALUATION ADULT - GENERAL OBSERVATIONS, REHAB EVAL
Chart reviewed. Patient encountered seated in chair in rehab preop room in [facility]. Patient underwent occupational therapy pre-operative consultation to determine current functional ADL limitations in order to provide the right equipment for patient to perform functional ADL post operation.

## 2023-04-17 NOTE — H&P PST ADULT - NSICDXPASTSURGICALHX_GEN_ALL_CORE_FT
PAST SURGICAL HISTORY:  History of tubal ligation 1991     PAST SURGICAL HISTORY:  H/O total knee replacement, left     H/O tubal ligation     History of tubal ligation 1991

## 2023-05-02 ENCOUNTER — TRANSCRIPTION ENCOUNTER (OUTPATIENT)
Age: 68
End: 2023-05-02

## 2023-05-02 RX ORDER — SENNA PLUS 8.6 MG/1
2 TABLET ORAL AT BEDTIME
Refills: 0 | Status: DISCONTINUED | OUTPATIENT
Start: 2023-05-03 | End: 2023-05-04

## 2023-05-02 RX ORDER — ACETAMINOPHEN 500 MG
975 TABLET ORAL EVERY 8 HOURS
Refills: 0 | Status: DISCONTINUED | OUTPATIENT
Start: 2023-05-03 | End: 2023-05-04

## 2023-05-02 RX ORDER — HYDROMORPHONE HYDROCHLORIDE 2 MG/ML
0.5 INJECTION INTRAMUSCULAR; INTRAVENOUS; SUBCUTANEOUS EVERY 4 HOURS
Refills: 0 | Status: DISCONTINUED | OUTPATIENT
Start: 2023-05-03 | End: 2023-05-04

## 2023-05-02 RX ORDER — TIMOLOL 0.5 %
1 DROPS OPHTHALMIC (EYE) DAILY
Refills: 0 | Status: DISCONTINUED | OUTPATIENT
Start: 2023-05-03 | End: 2023-05-04

## 2023-05-02 RX ORDER — POLYETHYLENE GLYCOL 3350 17 G/17G
17 POWDER, FOR SOLUTION ORAL AT BEDTIME
Refills: 0 | Status: DISCONTINUED | OUTPATIENT
Start: 2023-05-03 | End: 2023-05-04

## 2023-05-02 RX ORDER — ASCORBIC ACID 60 MG
500 TABLET,CHEWABLE ORAL
Refills: 0 | Status: DISCONTINUED | OUTPATIENT
Start: 2023-05-03 | End: 2023-05-04

## 2023-05-02 RX ORDER — DORZOLAMIDE HYDROCHLORIDE 20 MG/ML
1 SOLUTION/ DROPS OPHTHALMIC
Refills: 0 | Status: DISCONTINUED | OUTPATIENT
Start: 2023-05-03 | End: 2023-05-04

## 2023-05-02 RX ORDER — PANTOPRAZOLE SODIUM 20 MG/1
40 TABLET, DELAYED RELEASE ORAL
Refills: 0 | Status: DISCONTINUED | OUTPATIENT
Start: 2023-05-03 | End: 2023-05-04

## 2023-05-02 RX ORDER — ONDANSETRON 8 MG/1
4 TABLET, FILM COATED ORAL EVERY 6 HOURS
Refills: 0 | Status: DISCONTINUED | OUTPATIENT
Start: 2023-05-03 | End: 2023-05-04

## 2023-05-02 RX ORDER — ASPIRIN/CALCIUM CARB/MAGNESIUM 324 MG
81 TABLET ORAL
Refills: 0 | Status: DISCONTINUED | OUTPATIENT
Start: 2023-05-04 | End: 2023-05-04

## 2023-05-02 RX ORDER — OXYCODONE HYDROCHLORIDE 5 MG/1
5 TABLET ORAL
Refills: 0 | Status: DISCONTINUED | OUTPATIENT
Start: 2023-05-03 | End: 2023-05-04

## 2023-05-02 RX ORDER — MAGNESIUM HYDROXIDE 400 MG/1
30 TABLET, CHEWABLE ORAL DAILY
Refills: 0 | Status: DISCONTINUED | OUTPATIENT
Start: 2023-05-03 | End: 2023-05-04

## 2023-05-02 RX ORDER — SODIUM CHLORIDE 9 MG/ML
1000 INJECTION, SOLUTION INTRAVENOUS
Refills: 0 | Status: DISCONTINUED | OUTPATIENT
Start: 2023-05-03 | End: 2023-05-04

## 2023-05-02 RX ORDER — CELECOXIB 200 MG/1
200 CAPSULE ORAL EVERY 12 HOURS
Refills: 0 | Status: DISCONTINUED | OUTPATIENT
Start: 2023-05-04 | End: 2023-05-04

## 2023-05-02 RX ORDER — LANOLIN ALCOHOL/MO/W.PET/CERES
3 CREAM (GRAM) TOPICAL AT BEDTIME
Refills: 0 | Status: DISCONTINUED | OUTPATIENT
Start: 2023-05-03 | End: 2023-05-04

## 2023-05-02 RX ORDER — ALBUTEROL 90 UG/1
2 AEROSOL, METERED ORAL EVERY 6 HOURS
Refills: 0 | Status: DISCONTINUED | OUTPATIENT
Start: 2023-05-03 | End: 2023-05-04

## 2023-05-02 RX ORDER — OXYCODONE HYDROCHLORIDE 5 MG/1
10 TABLET ORAL
Refills: 0 | Status: DISCONTINUED | OUTPATIENT
Start: 2023-05-03 | End: 2023-05-04

## 2023-05-03 ENCOUNTER — APPOINTMENT (OUTPATIENT)
Dept: ORTHOPEDIC SURGERY | Facility: HOSPITAL | Age: 68
End: 2023-05-03
Payer: MEDICARE

## 2023-05-03 ENCOUNTER — TRANSCRIPTION ENCOUNTER (OUTPATIENT)
Age: 68
End: 2023-05-03

## 2023-05-03 ENCOUNTER — INPATIENT (INPATIENT)
Facility: HOSPITAL | Age: 68
LOS: 0 days | Discharge: HOME HEALTH | End: 2023-05-04
Attending: STUDENT IN AN ORGANIZED HEALTH CARE EDUCATION/TRAINING PROGRAM | Admitting: STUDENT IN AN ORGANIZED HEALTH CARE EDUCATION/TRAINING PROGRAM
Payer: MEDICARE

## 2023-05-03 VITALS
HEART RATE: 56 BPM | HEIGHT: 65 IN | DIASTOLIC BLOOD PRESSURE: 72 MMHG | RESPIRATION RATE: 17 BRPM | TEMPERATURE: 97 F | WEIGHT: 169.98 LBS | SYSTOLIC BLOOD PRESSURE: 142 MMHG | OXYGEN SATURATION: 100 %

## 2023-05-03 DIAGNOSIS — Z96.652 PRESENCE OF LEFT ARTIFICIAL KNEE JOINT: Chronic | ICD-10-CM

## 2023-05-03 DIAGNOSIS — Z98.51 TUBAL LIGATION STATUS: Chronic | ICD-10-CM

## 2023-05-03 LAB
ANION GAP SERPL CALC-SCNC: 2 MMOL/L — LOW (ref 5–17)
BUN SERPL-MCNC: 14 MG/DL — SIGNIFICANT CHANGE UP (ref 7–23)
CALCIUM SERPL-MCNC: 8.7 MG/DL — SIGNIFICANT CHANGE UP (ref 8.5–10.1)
CHLORIDE SERPL-SCNC: 112 MMOL/L — HIGH (ref 96–108)
CO2 SERPL-SCNC: 25 MMOL/L — SIGNIFICANT CHANGE UP (ref 22–31)
CREAT SERPL-MCNC: 0.76 MG/DL — SIGNIFICANT CHANGE UP (ref 0.5–1.3)
EGFR: 85 ML/MIN/1.73M2 — SIGNIFICANT CHANGE UP
GLUCOSE SERPL-MCNC: 82 MG/DL — SIGNIFICANT CHANGE UP (ref 70–99)
HCT VFR BLD CALC: 46 % — HIGH (ref 34.5–45)
HGB BLD-MCNC: 13.8 G/DL — SIGNIFICANT CHANGE UP (ref 11.5–15.5)
MCHC RBC-ENTMCNC: 26.1 PG — LOW (ref 27–34)
MCHC RBC-ENTMCNC: 30 G/DL — LOW (ref 32–36)
MCV RBC AUTO: 87.1 FL — SIGNIFICANT CHANGE UP (ref 80–100)
NRBC # BLD: 0 /100 WBCS — SIGNIFICANT CHANGE UP (ref 0–0)
PLATELET # BLD AUTO: 205 K/UL — SIGNIFICANT CHANGE UP (ref 150–400)
POTASSIUM SERPL-MCNC: 3.6 MMOL/L — SIGNIFICANT CHANGE UP (ref 3.5–5.3)
POTASSIUM SERPL-SCNC: 3.6 MMOL/L — SIGNIFICANT CHANGE UP (ref 3.5–5.3)
RBC # BLD: 5.28 M/UL — HIGH (ref 3.8–5.2)
RBC # FLD: 13.4 % — SIGNIFICANT CHANGE UP (ref 10.3–14.5)
SODIUM SERPL-SCNC: 139 MMOL/L — SIGNIFICANT CHANGE UP (ref 135–145)
WBC # BLD: 6.94 K/UL — SIGNIFICANT CHANGE UP (ref 3.8–10.5)
WBC # FLD AUTO: 6.94 K/UL — SIGNIFICANT CHANGE UP (ref 3.8–10.5)

## 2023-05-03 PROCEDURE — 27447 TOTAL KNEE ARTHROPLASTY: CPT | Mod: AS,RT

## 2023-05-03 PROCEDURE — 73560 X-RAY EXAM OF KNEE 1 OR 2: CPT | Mod: 26,RT

## 2023-05-03 PROCEDURE — 27447 TOTAL KNEE ARTHROPLASTY: CPT | Mod: RT

## 2023-05-03 DEVICE — PIN SPD NON-RIMMED 65MM STRL: Type: IMPLANTABLE DEVICE | Site: RIGHT | Status: FUNCTIONAL

## 2023-05-03 DEVICE — IMPLANTABLE DEVICE: Type: IMPLANTABLE DEVICE | Site: RIGHT | Status: FUNCTIONAL

## 2023-05-03 DEVICE — COMP PATELLA POROUS RND SZ 32MM: Type: IMPLANTABLE DEVICE | Site: RIGHT | Status: FUNCTIONAL

## 2023-05-03 RX ORDER — SODIUM CHLORIDE 9 MG/ML
1000 INJECTION, SOLUTION INTRAVENOUS
Refills: 0 | Status: DISCONTINUED | OUTPATIENT
Start: 2023-05-03 | End: 2023-05-03

## 2023-05-03 RX ORDER — HYDROMORPHONE HYDROCHLORIDE 2 MG/ML
0.5 INJECTION INTRAMUSCULAR; INTRAVENOUS; SUBCUTANEOUS
Refills: 0 | Status: DISCONTINUED | OUTPATIENT
Start: 2023-05-03 | End: 2023-05-03

## 2023-05-03 RX ORDER — CEFAZOLIN SODIUM 1 G
2000 VIAL (EA) INJECTION EVERY 8 HOURS
Refills: 0 | Status: COMPLETED | OUTPATIENT
Start: 2023-05-03 | End: 2023-05-04

## 2023-05-03 RX ORDER — CELECOXIB 200 MG/1
200 CAPSULE ORAL ONCE
Refills: 0 | Status: COMPLETED | OUTPATIENT
Start: 2023-05-03 | End: 2023-05-03

## 2023-05-03 RX ORDER — ACETAMINOPHEN 500 MG
1000 TABLET ORAL ONCE
Refills: 0 | Status: COMPLETED | OUTPATIENT
Start: 2023-05-03 | End: 2023-05-03

## 2023-05-03 RX ORDER — ACETAMINOPHEN 500 MG
650 TABLET ORAL ONCE
Refills: 0 | Status: COMPLETED | OUTPATIENT
Start: 2023-05-03 | End: 2023-05-03

## 2023-05-03 RX ORDER — DEXAMETHASONE 0.5 MG/5ML
10 ELIXIR ORAL ONCE
Refills: 0 | Status: COMPLETED | OUTPATIENT
Start: 2023-05-04 | End: 2023-05-04

## 2023-05-03 RX ORDER — FENTANYL CITRATE 50 UG/ML
25 INJECTION INTRAVENOUS
Refills: 0 | Status: DISCONTINUED | OUTPATIENT
Start: 2023-05-03 | End: 2023-05-03

## 2023-05-03 RX ORDER — ONDANSETRON 8 MG/1
4 TABLET, FILM COATED ORAL ONCE
Refills: 0 | Status: COMPLETED | OUTPATIENT
Start: 2023-05-03 | End: 2023-05-03

## 2023-05-03 RX ADMIN — Medication 400 MILLIGRAM(S): at 19:16

## 2023-05-03 RX ADMIN — Medication 650 MILLIGRAM(S): at 12:18

## 2023-05-03 RX ADMIN — OXYCODONE HYDROCHLORIDE 10 MILLIGRAM(S): 5 TABLET ORAL at 20:46

## 2023-05-03 RX ADMIN — SODIUM CHLORIDE 125 MILLILITER(S): 9 INJECTION, SOLUTION INTRAVENOUS at 17:36

## 2023-05-03 RX ADMIN — CELECOXIB 200 MILLIGRAM(S): 200 CAPSULE ORAL at 12:19

## 2023-05-03 RX ADMIN — POLYETHYLENE GLYCOL 3350 17 GRAM(S): 17 POWDER, FOR SOLUTION ORAL at 21:15

## 2023-05-03 RX ADMIN — ONDANSETRON 4 MILLIGRAM(S): 8 TABLET, FILM COATED ORAL at 15:59

## 2023-05-03 RX ADMIN — SODIUM CHLORIDE 3 MILLILITER(S): 9 INJECTION INTRAMUSCULAR; INTRAVENOUS; SUBCUTANEOUS at 22:26

## 2023-05-03 RX ADMIN — Medication 1000 MILLIGRAM(S): at 19:46

## 2023-05-03 RX ADMIN — OXYCODONE HYDROCHLORIDE 10 MILLIGRAM(S): 5 TABLET ORAL at 19:46

## 2023-05-03 RX ADMIN — Medication 100 MILLIGRAM(S): at 21:15

## 2023-05-03 RX ADMIN — SENNA PLUS 2 TABLET(S): 8.6 TABLET ORAL at 21:13

## 2023-05-03 RX ADMIN — Medication 500 MILLIGRAM(S): at 17:36

## 2023-05-03 RX ADMIN — OXYCODONE HYDROCHLORIDE 10 MILLIGRAM(S): 5 TABLET ORAL at 23:29

## 2023-05-03 RX ADMIN — SODIUM CHLORIDE 100 MILLILITER(S): 9 INJECTION, SOLUTION INTRAVENOUS at 15:59

## 2023-05-03 NOTE — DISCHARGE NOTE PROVIDER - HOSPITAL COURSE
67yFemale with history of Right Knee Osteoarthritis presenting for Right TKA by Dr. Cavazos on 5/3/23. Risk and benefits of surgery were explained to the patient. The patient understood and agreed to proceed with surgery. Patient underwent the procedure with no intraoperative complications. Pt was brought in stable condition to the PACU. Once stable in PACU, pt was brought to the floor. During hospital stay pt was followed by Medicine, physical therapy, Home Care during this admission. Pt is stable for discharge 67yFemale with history of Right Knee Osteoarthritis presenting for Right TKA by Dr. Cavazos on 5/3/23. Risk and benefits of surgery were explained to the patient. The patient understood and agreed to proceed with surgery. Patient underwent the procedure with no intraoperative complications. Pt was brought in stable condition to the PACU. Once stable in PACU, pt was brought to the floor. During hospital stay pt was followed by Medicine, physical therapy, Home Care during this admission. Pt is stable for discharge on POD#1.

## 2023-05-03 NOTE — DISCHARGE NOTE PROVIDER - CARE PROVIDER_API CALL
Frederick Cavazos)  Dimitri Mccoy  Physicians  [address] [place] [address]  Phone: [phone]  Fax: [phone]  Follow Up Time:

## 2023-05-03 NOTE — ASU PREOP CHECKLIST - IDENTIFICATION BAND VERIFIED
done
Bed in lowest position, wheels locked, appropriate side rails in place/Call bell, personal items and telephone in reach/Instruct patient to call for assistance before getting out of bed or chair/Non-slip footwear when patient is out of bed/Acra to call system/Physically safe environment - no spills, clutter or unnecessary equipment/Purposeful Proactive Rounding/Room/bathroom lighting operational, light cord in reach

## 2023-05-03 NOTE — PHYSICAL THERAPY INITIAL EVALUATION ADULT - ACTIVE RANGE OF MOTION EXAMINATION, REHAB EVAL
R knee flexion 80 degrees, extension - 5 degrees/Left LE Active ROM was WFL (within functional limits)/deficits as listed below

## 2023-05-03 NOTE — DISCHARGE NOTE PROVIDER - NSDCFUADDINST_GEN_ALL_CORE_FT
Keep Prineo Dressing Clean, Dry and Intact. May shower with Prineo Dressing. Please do not scrub, soak, peel or pick at the prineo dressing. No creams, lotions, or oils over dressing. May shower and let water run over incision, no baths. Pat dry once out of shower. Dressing to be removed in office at follow up visit in 2 weeks.  Keep Prineo Dressing Clean, Dry and Intact. May shower with Prineo Dressing. Please do not scrub, soak, peel or pick at the prineo dressing. No creams, lotions, or oils over dressing. May shower and let water run over incision, no baths. Pat dry once out of shower. Dressing to be removed in office at follow up visit in 2 weeks.   Keep knee straight while at rest. Elevate the leg as much as possible ("toes above the nose") to help control swelling. Make sure you get up and take a brief walk every two hours to help with circulation and prevent stiffness. Incentive spirometer 10X/hour. Cryocuff to help with pain/inflammation.

## 2023-05-03 NOTE — DISCHARGE NOTE PROVIDER - NSDCFUSCHEDAPPT_GEN_ALL_CORE_FT
[facility]  [address]  Scheduled Appointment: 05/15/2023

## 2023-05-03 NOTE — DISCHARGE NOTE PROVIDER - NSDCMRMEDTOKEN_GEN_ALL_CORE_FT
Albuterol (Eqv-Proventil HFA) 90 mcg/inh inhalation aerosol: 2 puff(s) inhaled every 6 hours, As Needed  dorzolamide 2% ophthalmic solution: 1 in each eye  ibuprofen 600 mg oral tablet: 1 tab(s) orally 3 times a day, As Needed for moderate pain  multivitamin:     perform covid-19 pcr: anytime from apr 29- may 1  polyethylene glycol 3350 oral powder for reconstitution: 17 gram(s) orally once a day (at bedtime)  Timolol Maleate (Eqv-Timoptic) 0.5% ophthalmic solution: 1 in each affected eye  Vitamin B-12:  orally   Vitamin B12:   Vitamin D3 2000 intl units (50 mcg) oral capsule:    acetaminophen 325 mg oral tablet: 3 tab(s) orally every 8 hours As needed Mild Pain (1 - 3)  albuterol 90 mcg/inh inhalation aerosol: 2 puff(s) inhaled every 6 hours As needed Shortness of Breath and/or Wheezing  ascorbic acid 500 mg oral tablet: 1 tab(s) orally 2 times a day  Aspirin Enteric Coated 81 mg oral delayed release tablet: 1 tab(s) orally 2 times a day MDD: 2  celecoxib 200 mg oral capsule: 1 cap(s) orally every 12 hours MDD: 2  dorzolamide 2% ophthalmic solution: 1 drop(s) to each affected eye 2 times a day  Multiple Vitamins oral tablet: 1 tab(s) orally once a day  Narcan 4 mg/0.1 mL nasal spray: 4 milligram(s) intranasally once , repeat as necessary.   As needed. For suspected opiate overdose   Follow instructions on packet MDD: 0.2 ml  oxyCODONE 5 mg oral tablet: 1 tab(s) orally every 4 hours 1 tab for mild/mod pain 2 tabs for severe pain MDD: 6  pantoprazole 40 mg oral delayed release tablet: 1 tab(s) orally once a day (before a meal) MDD: 1  polyethylene glycol 3350 oral powder for reconstitution: 17 gram(s) orally once a day (at bedtime)  senna leaf extract oral tablet: 2 tab(s) orally once a day (at bedtime)  timolol maleate 0.5% ophthalmic solution: 1 drop(s) to each affected eye once a day

## 2023-05-03 NOTE — CONSULT NOTE ADULT - SUBJECTIVE AND OBJECTIVE BOX
NEETA MATA is a 67y Female s/p RIGHT TOTAL KNEE REPLACEMENT      w/ h/o Seasonal allergies    Sinusitis, chronic    Seasonal allergies    Osteoarthritis of left knee    Asthma    COVID-19 vaccine series completed    Glaucoma      denies any chest pain shortness of breath palpitation dizziness lightheadedness nausea vomiting fever or chills    H/O tubal ligation    History of tubal ligation    H/O total knee replacement, left      No pertinent family history in first degree relatives    No pertinent family history in first degree relatives      SH: doesnot smoke or drink at this time    No Known Allergies    acetaminophen     Tablet .. 975 milliGRAM(s) Oral every 8 hours PRN  albuterol    90 MICROgram(s) HFA Inhaler 2 Puff(s) Inhalation every 6 hours PRN  ascorbic acid 500 milliGRAM(s) Oral two times a day  ceFAZolin   IVPB 2000 milliGRAM(s) IV Intermittent every 8 hours  dorzolamide 2% Ophthalmic Solution 1 Drop(s) Both EYES <User Schedule>  HYDROmorphone  Injectable 0.5 milliGRAM(s) IV Push every 4 hours PRN  lactated ringers. 1000 milliLiter(s) IV Continuous <Continuous>  magnesium hydroxide Suspension 30 milliLiter(s) Oral daily PRN  melatonin 3 milliGRAM(s) Oral at bedtime PRN  multivitamin 1 Tablet(s) Oral daily  ondansetron Injectable 4 milliGRAM(s) IV Push every 6 hours PRN  oxyCODONE    IR 5 milliGRAM(s) Oral every 3 hours PRN  oxyCODONE    IR 10 milliGRAM(s) Oral every 3 hours PRN  pantoprazole    Tablet 40 milliGRAM(s) Oral before breakfast  polyethylene glycol 3350 17 Gram(s) Oral at bedtime  polyethylene glycol 3350 17 Gram(s) Oral at bedtime  senna 2 Tablet(s) Oral at bedtime  sodium chloride 0.9% lock flush 3 milliLiter(s) IV Push every 8 hours  timolol 0.5% Solution 1 Drop(s) Both EYES daily    T(C): 36.7 (05-03-23 @ 19:58), Max: 36.7 (05-03-23 @ 18:58)  HR: 68 (05-03-23 @ 19:58) (50 - 78)  BP: 143/81 (05-03-23 @ 19:58) (93/71 - 157/85)  RR: 16 (05-03-23 @ 19:58) (11 - 18)  SpO2: 96% (05-03-23 @ 19:58) (96% - 100%)  HEENT unremarkable  neck no JVD or bruit  heart normal S1 S2 RRR no gallops or rubs  chest clear to auscultation  abd sof nontender non distended +bs  ext no calf tenderness    A/P   DVT PX  pain control  bowel regimen   wound care as per ortho  GI PX  antiemetics prn  incentive spirometer

## 2023-05-03 NOTE — PATIENT PROFILE ADULT - FUNCTIONAL ASSESSMENT - DAILY ACTIVITY 5.
[facility] HOSPITALIST PROGRESS NOTE    Subjective     History:   Sean Chen is a 79 y.o. female admitted on 7/2/2022 secondary to <principal problem not specified>     Procedures: None    CC: Follow up hypoglycemia     Patient seen and examined with sitter present at bedside. Awake and alert but confused. More alert and interactive today. Reports improvement in her dyspnea. Unable to provide a reliable history. Intermittent agitation reported with a fall noted overnight. Episode of hypoxia noted overnight as well.      History taken from: chart, and RN.      Objective     Vital Signs  Temp:  [97.3 °F (36.3 °C)-98.1 °F (36.7 °C)] 97.6 °F (36.4 °C)  Heart Rate:  [72-95] 72  Resp:  [18-22] 20  BP: ()/(54-93) 94/54    Intake/Output Summary (Last 24 hours) at 7/5/2022 1218  Last data filed at 7/5/2022 0832  Gross per 24 hour   Intake 360 ml   Output --   Net 360 ml         Physical Exam:  General:    Awake, alert but confused, more alert and interactive today, in no acute distress, chronically ill appearing   Heart:      Normal S1 and S2. Regular rate and rhythm. No significant murmur, rubs or gallops appreciated.   Lungs:     Respirations regular, even and unlabored. Diminished breath sounds at bases. No wheezes, rales or rhonchi.   Abdomen:   Soft and nontender. No guarding, rebound tenderness or  organomegaly noted. Bowel sounds present x 4.   Extremities:  No clubbing, cyanosis or edema noted.      Results Review:    Results from last 7 days   Lab Units 07/05/22 0344 07/04/22 0113 07/02/22  2339 07/02/22  1626   WBC 10*3/mm3 10.43 8.33 10.18 7.05   HEMOGLOBIN g/dL 7.8* 8.7* 9.9* 11.0*   PLATELETS 10*3/mm3 254 249 241 259     Results from last 7 days   Lab Units 07/05/22 0344 07/04/22  0113 07/02/22  2339 07/02/22  1626   SODIUM mmol/L 130* 128* 133* 132*   POTASSIUM mmol/L 3.5 4.0 3.9 3.5   CHLORIDE mmol/L 91* 93* 97* 94*   CO2 mmol/L 23.1 22.6 24.0 24.3   BUN mg/dL 20 31* 24* 21    CREATININE mg/dL 1.83* 2.74* 2.52* 2.49*   CALCIUM mg/dL 8.5* 8.1* 7.7* 8.5*   GLUCOSE mg/dL 138* 136* 81 42*     Results from last 7 days   Lab Units 07/05/22  0344 07/04/22  0113 07/02/22  2339 07/02/22  1626   BILIRUBIN mg/dL 0.5 0.4 0.4 0.4   ALK PHOS U/L 100 105 94 113   AST (SGOT) U/L 13 13 14 16   ALT (SGPT) U/L 6 7 <5 7         Results from last 7 days   Lab Units 07/02/22  1626   INR  0.93     Results from last 7 days   Lab Units 07/02/22  2339 07/02/22  1846 07/02/22  1626   CK TOTAL U/L  --   --  41   TROPONIN T ng/mL 0.066* 0.055* 0.061*       Imaging Results (Last 24 Hours)     Procedure Component Value Units Date/Time    XR Knee 4+ View Bilateral [[id number]] Collected: 07/05/22 0821     Updated: 07/05/22 0824    Narrative:      EXAM:    XR Bilateral Knees Complete, 4 or More Views     EXAM DATE:    7/5/2022 5:51 AM     CLINICAL HISTORY:    status post fall; E16.2-Hypoglycemia, unspecified     TECHNIQUE:    Four or more views of the bilateral knees.     COMPARISON:    No relevant prior studies available.     FINDINGS:    Bones/joints:  Advanced tricompartmental osteoarthritis is noted.   Arthritic changes most pronounced of the medial knee compartments  bilaterally.  No joint effusions identified.  No acute fracture.  No  dislocation.    Soft tissues:  Unremarkable.    Vasculature:  Dense arterial vascular calcifications are noted.       Impression:      1.  Advanced tricompartmental osteoarthritis bilateral knees.  2.  No acute osseous or articular abnormalities identified.     This report was finalized on 7/5/2022 8:22 AM by Dr. Mingo Cardenas MD.       XR Chest AP [[id number]] Collected: 07/05/22 0621     Updated: 07/05/22 0623    Narrative:      CR Chest 1 Vw    INDICATION:   Congestive heart failure. Short of air     COMPARISON:    7/2/2022    FINDINGS:  Portable AP view(s) of the chest.    Right-sided large bore central line is unchanged    Cardiac silhouette not well visualized or assessed.  Shallow lung expansion with bronchovascular crowding. Bilateral partially layering pleural effusions, right greater than left with associated compressive atelectasis or pneumonia. Worsening opacities in  the perihilar right lung compared to the prior study may be partly positional. No pneumothorax.       Impression:        1. Bilateral pleural effusions with compressive atelectasis or pneumonia worse on the right than the left. Probable underlying volume overload. No pneumothorax.    Signer Name: Cristhian Ballard MD   Signed: 7/5/2022 6:21 AM   Workstation Name: [id number]    [facility]            Medications:  aspirin, 81 mg, Oral, Daily  atorvastatin, 80 mg, Oral, Nightly  aztreonam, 500 mg, Intravenous, Q12H  calcium acetate, 1,334 mg, Oral, TID With Meals  carvedilol, 6.25 mg, Oral, BID With Meals  heparin (porcine), 5,000 Units, Subcutaneous, Q8H  NIFEdipine CC, 60 mg, Oral, Q24H  Renal, 1 capsule, Oral, Daily  sodium chloride, 10 mL, Intravenous, Q12H  ziprasidone (GEODON) injection with sterile water, 10 mg, Intramuscular, Once               Assessment & Plan   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 Home insulin likely contributed with decreased PO intake. Holding home insulin regimen. BG stable. Will likely need reduced regimen at discharge. Cont to monitor.      Abnormal UA/possible UTI: UA abnormal with culture revealing 50,000 GNR. Cont Azactam empirically.     Acute metabolic encephalopathy superimposed on baseline dementia: Likely 2/2 above. CT head reveals presumed chronic microvascular ischemic changes with no acute findings. Remains confused but more alert compared to upon admission. PO intake remains poor and diet liberalized. Cont treatment as above and supportive treatment.      Acute on chronic combined systolic and diastolic CHF: Echo in 5/22 reveraled an EF of 36-40%. Non-adherance to fluid restriction and intermittent HD sessions possibly  contributing. Imaging consistent with CHF. ReDS vest reading remains elevated s/p HD yesterday. HD per nephrology. Monitor volume status.     Chronically elevated troponin's: Likely 2/2 ESRD. Trend overall flat and improved from previous. Cont ASA and statin. Monitor on telemetry.     ESRD on HD: Cont HD per nephrology recs with input appreciated.     Essential HTN: BP intermittently elevated but has fluctuated. Cont Coreg and Nifedipine for now. Cont to monitor.     Hyperlipidemia: Cont statin.     Generalized weakness: PT/OT.    DVT PPX: SQ heparin    Goals of Care: Palliative care following to assist with ongoing GOC discussions and to provide additional support with input appreciated.     Discussed with palliative care APRN.     Disposition: Home with family once medically stable.    Yovain Del Rosario DO  07/05/22  12:18 EDT   4 = No assist / stand by assistance

## 2023-05-03 NOTE — PHYSICAL THERAPY INITIAL EVALUATION ADULT - RANGE OF MOTION EXAMINATION, REHAB EVAL
R knee flexion 80 degrees, extension - 5 degrees/Left LE ROM was WFL (within functional limits)/deficits as listed below

## 2023-05-03 NOTE — PHYSICAL THERAPY INITIAL EVALUATION ADULT - GENERAL OBSERVATIONS, REHAB EVAL
Chart reviewed, pt encountered on supine, AxOx4, cooperative, + dressing on R knee, + IV removed before gait.

## 2023-05-03 NOTE — PHYSICAL THERAPY INITIAL EVALUATION ADULT - ADDITIONAL COMMENTS
as per patient, she lives in the basement with 9 stairs with B HR to access basement apartment, before he has 12 ich step without HR. pt was independent and has equipment from previous surgery. Owns a 3:1 commode, rolling walker and cane

## 2023-05-04 ENCOUNTER — TRANSCRIPTION ENCOUNTER (OUTPATIENT)
Age: 68
End: 2023-05-04

## 2023-05-04 VITALS
HEART RATE: 61 BPM | DIASTOLIC BLOOD PRESSURE: 75 MMHG | TEMPERATURE: 97 F | RESPIRATION RATE: 18 BRPM | OXYGEN SATURATION: 98 % | SYSTOLIC BLOOD PRESSURE: 131 MMHG

## 2023-05-04 LAB
ANION GAP SERPL CALC-SCNC: 3 MMOL/L — LOW (ref 5–17)
BUN SERPL-MCNC: 15 MG/DL — SIGNIFICANT CHANGE UP (ref 7–23)
CALCIUM SERPL-MCNC: 8.6 MG/DL — SIGNIFICANT CHANGE UP (ref 8.5–10.1)
CHLORIDE SERPL-SCNC: 108 MMOL/L — SIGNIFICANT CHANGE UP (ref 96–108)
CO2 SERPL-SCNC: 26 MMOL/L — SIGNIFICANT CHANGE UP (ref 22–31)
CREAT SERPL-MCNC: 0.8 MG/DL — SIGNIFICANT CHANGE UP (ref 0.5–1.3)
EGFR: 80 ML/MIN/1.73M2 — SIGNIFICANT CHANGE UP
GLUCOSE SERPL-MCNC: 152 MG/DL — HIGH (ref 70–99)
HCT VFR BLD CALC: 42.5 % — SIGNIFICANT CHANGE UP (ref 34.5–45)
HGB BLD-MCNC: 13.2 G/DL — SIGNIFICANT CHANGE UP (ref 11.5–15.5)
MCHC RBC-ENTMCNC: 26.1 PG — LOW (ref 27–34)
MCHC RBC-ENTMCNC: 31.1 G/DL — LOW (ref 32–36)
MCV RBC AUTO: 84.2 FL — SIGNIFICANT CHANGE UP (ref 80–100)
NRBC # BLD: 0 /100 WBCS — SIGNIFICANT CHANGE UP (ref 0–0)
PLATELET # BLD AUTO: 212 K/UL — SIGNIFICANT CHANGE UP (ref 150–400)
POTASSIUM SERPL-MCNC: 4.1 MMOL/L — SIGNIFICANT CHANGE UP (ref 3.5–5.3)
POTASSIUM SERPL-SCNC: 4.1 MMOL/L — SIGNIFICANT CHANGE UP (ref 3.5–5.3)
RBC # BLD: 5.05 M/UL — SIGNIFICANT CHANGE UP (ref 3.8–5.2)
RBC # FLD: 13.4 % — SIGNIFICANT CHANGE UP (ref 10.3–14.5)
SODIUM SERPL-SCNC: 137 MMOL/L — SIGNIFICANT CHANGE UP (ref 135–145)
WBC # BLD: 11.15 K/UL — HIGH (ref 3.8–10.5)
WBC # FLD AUTO: 11.15 K/UL — HIGH (ref 3.8–10.5)

## 2023-05-04 RX ORDER — PANTOPRAZOLE SODIUM 20 MG/1
1 TABLET, DELAYED RELEASE ORAL
Qty: 30 | Refills: 0
Start: 2023-05-04 | End: 2023-06-02

## 2023-05-04 RX ORDER — SENNA PLUS 8.6 MG/1
2 TABLET ORAL
Qty: 0 | Refills: 0 | DISCHARGE
Start: 2023-05-04

## 2023-05-04 RX ORDER — ASPIRIN/CALCIUM CARB/MAGNESIUM 324 MG
1 TABLET ORAL
Qty: 60 | Refills: 0
Start: 2023-05-04 | End: 2023-06-02

## 2023-05-04 RX ORDER — NALOXONE HYDROCHLORIDE 4 MG/.1ML
4 SPRAY NASAL
Qty: 1 | Refills: 0
Start: 2023-05-04 | End: 2023-05-04

## 2023-05-04 RX ORDER — ASCORBIC ACID 60 MG
1 TABLET,CHEWABLE ORAL
Qty: 0 | Refills: 0 | DISCHARGE
Start: 2023-05-04

## 2023-05-04 RX ORDER — PREGABALIN 225 MG/1
0 CAPSULE ORAL
Qty: 0 | Refills: 0 | DISCHARGE

## 2023-05-04 RX ORDER — CELECOXIB 200 MG/1
1 CAPSULE ORAL
Qty: 60 | Refills: 0
Start: 2023-05-04 | End: 2023-06-02

## 2023-05-04 RX ORDER — DORZOLAMIDE HYDROCHLORIDE 20 MG/ML
1 SOLUTION/ DROPS OPHTHALMIC
Qty: 0 | Refills: 0 | DISCHARGE
Start: 2023-05-04

## 2023-05-04 RX ORDER — ALBUTEROL 90 UG/1
2 AEROSOL, METERED ORAL
Qty: 0 | Refills: 0 | DISCHARGE
Start: 2023-05-04

## 2023-05-04 RX ORDER — KETOROLAC TROMETHAMINE 30 MG/ML
30 SYRINGE (ML) INJECTION ONCE
Refills: 0 | Status: DISCONTINUED | OUTPATIENT
Start: 2023-05-04 | End: 2023-05-04

## 2023-05-04 RX ORDER — POLYETHYLENE GLYCOL 3350 17 G/17G
17 POWDER, FOR SOLUTION ORAL
Qty: 0 | Refills: 0 | DISCHARGE
Start: 2023-05-04

## 2023-05-04 RX ORDER — METOCLOPRAMIDE HCL 10 MG
10 TABLET ORAL ONCE
Refills: 0 | Status: COMPLETED | OUTPATIENT
Start: 2023-05-04 | End: 2023-05-04

## 2023-05-04 RX ORDER — ONDANSETRON 8 MG/1
1 TABLET, FILM COATED ORAL
Qty: 28 | Refills: 0
Start: 2023-05-04 | End: 2023-05-10

## 2023-05-04 RX ORDER — ACETAMINOPHEN 500 MG
3 TABLET ORAL
Qty: 0 | Refills: 0 | DISCHARGE
Start: 2023-05-04

## 2023-05-04 RX ORDER — CHOLECALCIFEROL (VITAMIN D3) 125 MCG
0 CAPSULE ORAL
Qty: 0 | Refills: 0 | DISCHARGE

## 2023-05-04 RX ORDER — TIMOLOL 0.5 %
1 DROPS OPHTHALMIC (EYE)
Qty: 0 | Refills: 0 | DISCHARGE
Start: 2023-05-04

## 2023-05-04 RX ORDER — OXYCODONE HYDROCHLORIDE 5 MG/1
1 TABLET ORAL
Qty: 42 | Refills: 0
Start: 2023-05-04 | End: 2023-05-10

## 2023-05-04 RX ORDER — DORZOLAMIDE HYDROCHLORIDE 20 MG/ML
1 SOLUTION/ DROPS OPHTHALMIC
Refills: 0 | DISCHARGE

## 2023-05-04 RX ORDER — TIMOLOL 0.5 %
1 DROPS OPHTHALMIC (EYE)
Refills: 0 | DISCHARGE

## 2023-05-04 RX ORDER — ALBUTEROL 90 UG/1
2 AEROSOL, METERED ORAL
Qty: 0 | Refills: 0 | DISCHARGE

## 2023-05-04 RX ADMIN — Medication 1 TABLET(S): at 11:09

## 2023-05-04 RX ADMIN — OXYCODONE HYDROCHLORIDE 10 MILLIGRAM(S): 5 TABLET ORAL at 12:01

## 2023-05-04 RX ADMIN — Medication 30 MILLIGRAM(S): at 09:00

## 2023-05-04 RX ADMIN — Medication 102 MILLIGRAM(S): at 05:56

## 2023-05-04 RX ADMIN — Medication 500 MILLIGRAM(S): at 05:57

## 2023-05-04 RX ADMIN — Medication 100 MILLIGRAM(S): at 05:55

## 2023-05-04 RX ADMIN — OXYCODONE HYDROCHLORIDE 10 MILLIGRAM(S): 5 TABLET ORAL at 11:15

## 2023-05-04 RX ADMIN — OXYCODONE HYDROCHLORIDE 10 MILLIGRAM(S): 5 TABLET ORAL at 04:16

## 2023-05-04 RX ADMIN — PANTOPRAZOLE SODIUM 40 MILLIGRAM(S): 20 TABLET, DELAYED RELEASE ORAL at 05:53

## 2023-05-04 RX ADMIN — Medication 30 MILLIGRAM(S): at 08:46

## 2023-05-04 RX ADMIN — OXYCODONE HYDROCHLORIDE 10 MILLIGRAM(S): 5 TABLET ORAL at 03:16

## 2023-05-04 RX ADMIN — OXYCODONE HYDROCHLORIDE 10 MILLIGRAM(S): 5 TABLET ORAL at 00:29

## 2023-05-04 RX ADMIN — SODIUM CHLORIDE 3 MILLILITER(S): 9 INJECTION INTRAMUSCULAR; INTRAVENOUS; SUBCUTANEOUS at 05:58

## 2023-05-04 RX ADMIN — SODIUM CHLORIDE 3 MILLILITER(S): 9 INJECTION INTRAMUSCULAR; INTRAVENOUS; SUBCUTANEOUS at 13:05

## 2023-05-04 RX ADMIN — Medication 81 MILLIGRAM(S): at 05:53

## 2023-05-04 RX ADMIN — ONDANSETRON 4 MILLIGRAM(S): 8 TABLET, FILM COATED ORAL at 06:52

## 2023-05-04 RX ADMIN — Medication 10 MILLIGRAM(S): at 11:09

## 2023-05-04 RX ADMIN — CELECOXIB 200 MILLIGRAM(S): 200 CAPSULE ORAL at 05:53

## 2023-05-04 RX ADMIN — DORZOLAMIDE HYDROCHLORIDE 1 DROP(S): 20 SOLUTION/ DROPS OPHTHALMIC at 05:55

## 2023-05-04 NOTE — OCCUPATIONAL THERAPY INITIAL EVALUATION ADULT - GENERAL OBSERVATIONS, REHAB EVAL
Pt was encountered OOB in chair; NAD, S/P R TKR POD 1, RLE WBAT, R knee dressing clean, dry and intact, alert, cooperative, followed commands; pt c/o pain in R knee which limits pt performance with functional ADL's/transfers and mobility.

## 2023-05-04 NOTE — OCCUPATIONAL THERAPY INITIAL EVALUATION ADULT - ADDITIONAL COMMENTS
Pt lives with spouse (Who can provide limited assist) in a house with 1 threshold step (No rails) to enter. Once inside, the pt has 8 steps with bilateral handrails (Close to reach) to reach main floor where bedroom and bathroom is. The pts bathroom has a walk in shower, regular toilet seat and no grab bars. The pt reports that she has a 3/1 commode at home. The pt ambulates with a SAC and owns a rolling walker. Pt lives with spouse (Who can provide limited assist) in a house with 1 threshold step (No rails) to enter. Once inside, the pt has 8 steps with bilateral handrails (Close to reach) to reach main floor where bedroom and bathroom is. The pts bathroom has a walk in shower, regular toilet seat and no grab bars. The pt reports that she has a 3/1 commode at home. The pt ambulates with a SAC and owns a rolling walker and a hip kit.

## 2023-05-04 NOTE — DISCHARGE NOTE NURSING/CASE MANAGEMENT/SOCIAL WORK - PATIENT PORTAL LINK FT
You can access the FollowMyHealth Patient Portal offered by [facility] by registering at the following website: [email or web address]. By joining [facility]’s FollowMyHealth portal, you will also be able to view your health information using other applications (apps) compatible with our system.

## 2023-05-04 NOTE — DISCHARGE NOTE NURSING/CASE MANAGEMENT/SOCIAL WORK - NSDCPEFALRISK_GEN_ALL_CORE
For information on Fall & Injury Prevention, visit: [email or web address] OR  [email or web address] OR  https://www.cdc.gov/steadi/patient.html

## 2023-05-04 NOTE — DISCHARGE NOTE NURSING/CASE MANAGEMENT/SOCIAL WORK - HAVE YOU RECEIVED AT LEAST TWO PFIZER AND/OR MODERNA VACCINATIONS (IN ANY COMBINATION) AND/OR ONE JOHNSON & JOHNSON VACCINATION?
The [address] [place] [address],[address], 845 [facility] [address], [address] [facility]  Phone: [phone]   Fax:     [phone]   Physical Therapy Re-Certification Plan of Care    Dear Dr. Gwendolyn Watkins,    We had the pleasure of treating the following patient for physical therapy services at [address] [place] [address]. Chad Alfaro    :  1959  MRN: [id number]  Restrictions/Precautions:    Medical/Treatment Diagnosis Information:  · Diagnosis: M54.16 Lumbar radiculopathy, M41.9 Scoliosis/kyphoscoliosis  · Treatment Diagnosis: M54.5 Low back pain  Insurance/Certification information:  PT Insurance Information: [employer]  Physician Information:  Referring Practitioner: Dianna Garcia Dead Bugs  7/27   Glut isometrics  7/9 Reintroduced d/t pt c/o LBP   Bridges 3x10 w/ 5\"h ^7/23 Grey TB around knees   Hip add iso  6/23 hook lying  Progressed to alternating isometrics below   Alternating isometrics add/abd  7/20 in hook lying   Bent knee fall outs 3x10 bilat grey TB around thighs 7/30 in hooklying   SLR flexion 1.5# 3x10 bilat  ^8/11   SLR adduction 1.5# 3x10 bilat ^8/13   SLR abduction 1.5# 3x10 bilat ^8/11   Clams  ^7/14 hook lying   7/23 Withheld d/t fatigue from SLR   Mini squats with hip abduction iso 3x10 with Grey TB around thighs ^8/3   Heel raises 3x10 bilat ^8/3 completed on airex   Marches 3x10 alt LE Red TB ^8/11 bilat UE support   Standing hip abd Red TB around knees 2x10 bilat 8/13 completed standing at ledge.     Romberg on airex 30\"x5 EC ^8/11            Manual Intervention                                                Therapeutic Exercise and NMR EXR  [x] (32680) Provided verbal/tactile cueing for activities related to strengthening, flexibility, endurance, ROM  for improvements in proximal hip and core control with self care, mobility, lifting and ambulation. [x] (49748) Provided verbal/tactile cueing for activities related to improving balance, coordination, kinesthetic sense, posture, motor skill, proprioception  to assist with core control in self care, mobility, lifting, and ambulation. Therapeutic Activities:    [] (68308 or 68280) Provided verbal/tactile cueing for activities related to improving balance, coordination, kinesthetic sense, posture, motor skill, proprioception and motor activation to allow for proper function  with self care and ADLs  [] (19540) Provided training and instruction to the patient for proper core and proximal hip recruitment and positioning with ambulation re-education     Home Exercise Program:    [x] (60918) Reviewed/Progressed HEP activities related to strengthening, flexibility, endurance, ROM of core, proximal hip and LE for functional self-care, mobility, lifting and ambulation   [] (46853) Reviewed/Progressed HEP activities related to improving balance, coordination, kinesthetic sense, posture, motor skill, proprioception of core, proximal hip and LE for self care, mobility, lifting, and ambulation      Manual Treatments:  PROM / STM / Oscillations-Mobs:  G-I, II, III, IV (PA's, Inf., Post.)  [] (09993) Provided manual therapy to mobilize proximal hip and LS spine soft tissue/joints for the purpose of modulating pain, promoting relaxation,  increasing ROM, reducing/eliminating soft tissue swelling/inflammation/restriction, improving soft tissue extensibility and allowing for proper ROM for normal function with self care, mobility, lifting and ambulation.      Modalities:       Charges:  Timed Code Treatment Minutes: 48'   Total Treatment Minutes: 9:56-11:03  79'       [] EVAL (LOW) 05963 (typically 20 minutes face-to-face)  [] EVAL (MOD) 85101 (typically 30 minutes face-to-face)  [] EVAL (HIGH) 86443 (typically 45 minutes face-to-face)  [] RE-EVAL     [x] SZ(63398) x   2  [] Yes

## 2023-05-04 NOTE — PROGRESS NOTE ADULT - SUBJECTIVE AND OBJECTIVE BOX
NEETA MATA is a 67y Female s/p RIGHT TOTAL KNEE REPLACEMENT        denies any chest pain shortness of breath palpitation dizziness lightheadedness nausea vomiting fever or chills    T(C): 37.1 (05-04-23 @ 08:33), Max: 37.1 (05-04-23 @ 08:33)  HR: 58 (05-04-23 @ 10:10) (50 - 78)  BP: 127/72 (05-04-23 @ 10:10) (93/71 - 157/85)  RR: 18 (05-04-23 @ 08:33) (11 - 18)  SpO2: 98% (05-04-23 @ 10:10) (96% - 100%)  no jvd/bruit  s1 s2 rrr  cta  s/nt/nd  no calf tend                        13.2   11.15 )-----------( 212      ( 04 May 2023 06:00 )             42.5   05-04    137  |  108  |  15  ----------------------------<  152<H>  4.1   |  26  |  0.80    Ca    8.6      04 May 2023 06:00        cont dvt px  pain control  bowel regimen  antiemetics  incentive spirometer
Post-op Check   POD#0 S/P Right TKA  67yFemale Patient seen and examined, Pain controlled  Patient Denies SOB, CP, N/V/D       PE: Right Knee/LE: Dressing C/D/I, Sensation/motor intact, DP 2+, FROM ankle/toes   B/L LE: Skin intact. +ROM hip/knee/ankle/toes. Ankle Dorsi/plantarflexion: 5/5. Calf: soft, compressible and nontender. DP/PT 2+ NVI                          13.8   6.94  )-----------( 205      ( 03 May 2023 15:39 )             46.0       05-03    139  |  112<H>  |  14  ----------------------------<  82  3.6   |  25  |  0.76    Ca    8.7      03 May 2023 15:39          A: As above   P: Pain Control       DVT Prophylaxis      Incentive spirometry      PT WBAT RLE       Isometric exercises      Discharge Planning      All the above discussed and understood by pt       Ortho to F/U

## 2023-05-08 DIAGNOSIS — Z96.652 PRESENCE OF LEFT ARTIFICIAL KNEE JOINT: ICD-10-CM

## 2023-05-08 DIAGNOSIS — M17.11 UNILATERAL PRIMARY OSTEOARTHRITIS, RIGHT KNEE: ICD-10-CM

## 2023-05-08 DIAGNOSIS — J45.909 UNSPECIFIED ASTHMA, UNCOMPLICATED: ICD-10-CM

## 2023-05-08 DIAGNOSIS — H40.9 UNSPECIFIED GLAUCOMA: ICD-10-CM

## 2023-05-14 ENCOUNTER — FORM ENCOUNTER (OUTPATIENT)
Age: 68
End: 2023-05-14

## 2023-05-15 ENCOUNTER — FORM ENCOUNTER (OUTPATIENT)
Age: 68
End: 2023-05-15

## 2023-05-16 DIAGNOSIS — Z96.659 PRESENCE OF UNSPECIFIED ARTIFICIAL KNEE JOINT: ICD-10-CM

## 2023-05-17 ENCOUNTER — APPOINTMENT (OUTPATIENT)
Dept: ORTHOPEDIC SURGERY | Facility: CLINIC | Age: 68
End: 2023-05-17
Payer: MEDICARE

## 2023-05-17 VITALS — WEIGHT: 170 LBS | HEIGHT: 65 IN | BODY MASS INDEX: 28.32 KG/M2

## 2023-05-17 PROBLEM — H40.9 UNSPECIFIED GLAUCOMA: Chronic | Status: ACTIVE | Noted: 2023-04-17

## 2023-05-17 PROCEDURE — 99024 POSTOP FOLLOW-UP VISIT: CPT

## 2023-05-17 PROCEDURE — 73562 X-RAY EXAM OF KNEE 3: CPT | Mod: RT

## 2023-05-17 NOTE — PHYSICAL EXAM
[Right] : right knee [Components well fixed, in good position] : Components well fixed, in good position [de-identified] : RIGHT KNEE\par Incision is clean and dry with no drainage - dressing removed -\par Sensation intact\par Motor 5/5 \par +1 edema LE\par ROM 0-95\par \par

## 2023-05-17 NOTE — ASSESSMENT
[FreeTextEntry1] : 67F 2 weeks s/p R TKA 5/3/23\par \par She is doing well today and happy with progress. Begin outpatient PT. All questions and concerns were addressed. \par \par We discussed the patient's progress and they were reminded of their antibiotic prophylaxis. We discussed continued physical therapy and/or a home exercise program. Questions about their knee replacement and future follow up were answered and discussed.\par \par

## 2023-05-17 NOTE — HISTORY OF PRESENT ILLNESS
[5] : 5 [4] : 4 [Dull/Aching] : dull/aching [] : Post Surgical Visit: yes [de-identified] : 67F p/w R knee pain. Has had pain for many years, she has tried PT and injections in the past with temporary relief. She says the pain has been worsening steadily. Makes walking long distances difficult, trouble with stairs.\par \par 5/17/23: 14 days s/p R TKA. She is doing well and notes gradual improvement. Takes oxy at nighttime to aid in sleep. [FreeTextEntry7] : down the leg  [de-identified] : 05/03/23 [de-identified] : WENDY HILL

## 2023-06-01 ENCOUNTER — FORM ENCOUNTER (OUTPATIENT)
Age: 68
End: 2023-06-01

## 2023-06-02 RX ORDER — OXYCODONE 5 MG/1
5 TABLET ORAL EVERY 4 HOURS
Qty: 42 | Refills: 0 | Status: ACTIVE | COMMUNITY
Start: 2023-05-16 | End: 1900-01-01

## 2023-06-06 NOTE — H&P PST ADULT - GENERAL
negative Advancement-Rotation Flap Text: The defect edges were debeveled with a #15 scalpel blade.  Given the location of the defect, shape of the defect and the proximity to free margins an advancement-rotation flap was deemed most appropriate.  Using a sterile surgical marker, an appropriate flap was drawn incorporating the defect and placing the expected incisions within the relaxed skin tension lines where possible. The area thus outlined was incised deep to adipose tissue with a #15 scalpel blade.  The skin margins were undermined to an appropriate distance in all directions utilizing iris scissors.

## 2023-06-19 ENCOUNTER — APPOINTMENT (OUTPATIENT)
Dept: ORTHOPEDIC SURGERY | Facility: CLINIC | Age: 68
End: 2023-06-19
Payer: MEDICARE

## 2023-06-19 VITALS — WEIGHT: 170 LBS | HEIGHT: 65 IN | BODY MASS INDEX: 28.32 KG/M2

## 2023-06-19 PROCEDURE — 99024 POSTOP FOLLOW-UP VISIT: CPT

## 2023-06-19 RX ORDER — OXYCODONE 5 MG/1
5 TABLET ORAL
Qty: 42 | Refills: 0 | Status: ACTIVE | COMMUNITY
Start: 2023-06-19 | End: 1900-01-01

## 2023-06-19 NOTE — HISTORY OF PRESENT ILLNESS
[5] : 5 [4] : 4 [Dull/Aching] : dull/aching [de-identified] : 67F p/w R knee pain. Has had pain for many years, she has tried PT and injections in the past with temporary relief. She says the pain has been worsening steadily. Makes walking long distances difficult, trouble with stairs.\par \par 5/17/23: 14 days s/p R TKA. She is doing well and notes gradual improvement. Takes oxy at nighttime to aid in sleep.\par \par 6/19/23: 6.5 weeks s/p R TKA. doing PT and improving, denies n/v/f/c [] : no [FreeTextEntry7] : down the leg  [de-identified] : 05/03/23 [de-identified] : WENDY HILL

## 2023-06-19 NOTE — PHYSICAL EXAM
[Right] : right knee [Components well fixed, in good position] : Components well fixed, in good position [de-identified] : RIGHT KNEE\par Incision is clean and dry with no drainage - dressing removed -\par Sensation intact\par Motor 5/5 \par +1 edema LE\par ROM 0-110\par \par

## 2023-09-01 NOTE — PATIENT PROFILE ADULT - WILL THE PATIENT ACCEPT THE PFIZER COVID-19 VACCINE IF ELIGIBLE AND IT IS AVAILABLE?
Received referral via in basket from PCP. Pt has had several ED visits since March. Pt has frequent ED visits related to dental caries. Has not followed through with PCP recommendations. PCP requesting [facility] . Will also enroll in complex care management program and schedule outreach in upcoming week. Telephone call to Paris Tsai, [phone] with [facility] . She will assign an  RN Care Manager. They will make two telephone outreaches to patient and then mail a letter if unable to reach. No

## 2023-09-08 ENCOUNTER — NON-APPOINTMENT (OUTPATIENT)
Age: 68
End: 2023-09-08

## 2023-09-08 ENCOUNTER — APPOINTMENT (OUTPATIENT)
Dept: ORTHOPEDIC SURGERY | Facility: CLINIC | Age: 68
End: 2023-09-08
Payer: MEDICARE

## 2023-09-08 VITALS — HEIGHT: 65 IN | BODY MASS INDEX: 28.32 KG/M2 | WEIGHT: 170 LBS

## 2023-09-08 PROCEDURE — 73562 X-RAY EXAM OF KNEE 3: CPT | Mod: RT

## 2023-09-08 PROCEDURE — 99213 OFFICE O/P EST LOW 20 MIN: CPT

## 2023-09-08 NOTE — HISTORY OF PRESENT ILLNESS
[7] : 7 [3] : 3 [Dull/Aching] : dull/aching [Sharp] : sharp [Stabbing] : stabbing [de-identified] : 67F p/w R knee pain. Has had pain for many years, she has tried PT and injections in the past with temporary relief. She says the pain has been worsening steadily. Makes walking long distances difficult, trouble with stairs.  5/17/23: 14 days s/p R TKA. She is doing well and notes gradual improvement. Takes oxy at nighttime to aid in sleep.  6/19/23: 6.5 weeks s/p R TKA. doing PT and improving, denies n/v/f/c  9/7/23: 4 mon s/p R TKA. She continues to gradually improve. Pt has been beneficial  [] : Post Surgical Visit: no [FreeTextEntry1] : right knee  [de-identified] : pt

## 2023-09-08 NOTE — PHYSICAL EXAM
[Right] : right knee [Components well fixed, in good position] : Components well fixed, in good position [de-identified] : RIGHT KNEE Incision is clean and dry with no drainage - healing well  Sensation intact Motor 5/5  +1 edema LE ROM 0-110

## 2023-09-27 NOTE — H&P PST ADULT - LYMPH NODES
Anesthesia Pre Eval Note    Anesthesia ROS/Med Hx          Pulmonary Review:    Positive for COPD     Cardiovascular Review:    Positive for CAD  Positive for hypertension  Positive for hyperlipidemia    GI/HEPATIC/RENAL Review:    Positive for GERD Positive for liver disease     End/Other Review:  Positive for diabetes  Positive for anemia  Positive for cancer      Relevant Problems   No relevant active problems       Physical Exam     Airway   Mallampati: III  TM Distance: >3 FB  Neck ROM: Full  TMJ Mobility: Good    Dental Exam  Dental exam normal      Anesthesia Plan:    ASA Status: 4  Anesthesia Type: MAC    Induction: Intravenous  Maintenance: TIVA  Consent/Risks Discussed Statement:  The proposed anesthetic plan, including its risks and benefits, have been discussed with the Patient along with the risks and benefits of alternatives. Questions were encouraged and answered and the patient and/or representative understands and agrees to proceed.    I have discussed elements of the patient's history or examination, as noted above and/or as follows, that place the patient at higher risk of complications; BMI> 30 (obesity) and heart disease.    I discussed with the patient (and/or patient's legal representative) the risks and benefits of the proposed anesthesia plan, MAC, which may include services performed by other anesthesia providers.    Alternative anesthesia plans, if available, were reviewed with the patient (and/or patient's legal representative). Discussion has been held with the patient (and/or patient's legal representative) regarding risks of anesthesia, which include emergent situations that may require change in anesthesia plan.    The patient (and/or patient's legal representative) has indicated understanding, his/her questions have been answered, and he/she wishes to proceed with the planned anesthetic.      
No lymphadedenopathy

## 2023-10-09 ENCOUNTER — RX RENEWAL (OUTPATIENT)
Age: 68
End: 2023-10-09

## 2023-10-09 RX ORDER — MELOXICAM 15 MG/1
15 TABLET ORAL DAILY
Qty: 30 | Refills: 0 | Status: ACTIVE | COMMUNITY
Start: 2023-05-09 | End: 1900-01-01

## 2023-10-13 ENCOUNTER — APPOINTMENT (OUTPATIENT)
Dept: ORTHOPEDIC SURGERY | Facility: CLINIC | Age: 68
End: 2023-10-13
Payer: MEDICARE

## 2023-10-13 VITALS — HEIGHT: 65 IN | BODY MASS INDEX: 28.32 KG/M2 | WEIGHT: 170 LBS

## 2023-10-13 PROCEDURE — 99214 OFFICE O/P EST MOD 30 MIN: CPT

## 2023-12-29 ENCOUNTER — NON-APPOINTMENT (OUTPATIENT)
Age: 68
End: 2023-12-29

## 2023-12-29 ENCOUNTER — APPOINTMENT (OUTPATIENT)
Dept: ORTHOPEDIC SURGERY | Facility: CLINIC | Age: 68
End: 2023-12-29
Payer: MEDICARE

## 2023-12-29 VITALS — BODY MASS INDEX: 28.32 KG/M2 | WEIGHT: 170 LBS | HEIGHT: 65 IN

## 2023-12-29 PROCEDURE — 99214 OFFICE O/P EST MOD 30 MIN: CPT

## 2023-12-29 PROCEDURE — 73562 X-RAY EXAM OF KNEE 3: CPT | Mod: RT

## 2023-12-29 NOTE — PHYSICAL EXAM
[Right] : right knee [Components well fixed, in good position] : Components well fixed, in good position [de-identified] : RIGHT KNEE Incision is clean and dry with no drainage - healing well  Sensation intact Motor 5/5  +1 edema LE ROM 0-110

## 2023-12-29 NOTE — HISTORY OF PRESENT ILLNESS
[3] : 3 [2] : 2 [Dull/Aching] : dull/aching [Occasional] : occasional [Leisure] : leisure [Sleep] : sleep [Rest] : rest [Lying in bed] : lying in bed [de-identified] : 67F p/w R knee pain. Has had pain for many years, she has tried PT and injections in the past with temporary relief. She says the pain has been worsening steadily. Makes walking long distances difficult, trouble with stairs.  5/17/23: 14 days s/p R TKA. She is doing well and notes gradual improvement. Takes oxy at nighttime to aid in sleep.  6/19/23: 6.5 weeks s/p R TKA. doing PT and improving, denies n/v/f/c  9/7/23: 4 mon s/p R TKA. She continues to gradually improve. Pt has been beneficial  10/13/23 5 mo s/p R TKA, still having some pain at night and difficulty with stairs 12/29/23 7 mo s/p R TKA, pain improved, some feelings of stiffness but overall better, left knee giving her some trouble, feels stiff, no pain [] : no [FreeTextEntry1] : right knee [de-identified] : 10/13/23 [de-identified] : Dr. fleming [de-identified] : therapy

## 2024-06-07 ENCOUNTER — APPOINTMENT (OUTPATIENT)
Dept: ORTHOPEDIC SURGERY | Facility: CLINIC | Age: 69
End: 2024-06-07
Payer: MEDICARE

## 2024-06-07 VITALS — WEIGHT: 170 LBS | HEIGHT: 65 IN | BODY MASS INDEX: 28.32 KG/M2

## 2024-06-07 DIAGNOSIS — Z96.652 PAIN DUE TO INTERNAL ORTHOPEDIC PROSTHETIC DEVICES, IMPLANTS AND GRAFTS, INITIAL ENCOUNTER: ICD-10-CM

## 2024-06-07 DIAGNOSIS — T84.84XA PAIN DUE TO INTERNAL ORTHOPEDIC PROSTHETIC DEVICES, IMPLANTS AND GRAFTS, INITIAL ENCOUNTER: ICD-10-CM

## 2024-06-07 DIAGNOSIS — Z96.651 PRESENCE OF RIGHT ARTIFICIAL KNEE JOINT: ICD-10-CM

## 2024-06-07 PROCEDURE — 99214 OFFICE O/P EST MOD 30 MIN: CPT

## 2024-06-07 PROCEDURE — 73562 X-RAY EXAM OF KNEE 3: CPT | Mod: LT

## 2024-06-07 NOTE — PHYSICAL EXAM
[Right] : right knee [Components well fixed, in good position] : Components well fixed, in good position [de-identified] : RIGHT KNEE Incision is clean and dry with no drainage - healing well  Sensation intact Motor 5/5  +1 edema LE ROM 0-110

## 2024-06-07 NOTE — HISTORY OF PRESENT ILLNESS
[5] : 5 [4] : 4 [Dull/Aching] : dull/aching [Occasional] : occasional [Leisure] : leisure [Sleep] : sleep [Rest] : rest [Lying in bed] : lying in bed [de-identified] : 67F p/w R knee pain. Has had pain for many years, she has tried PT and injections in the past with temporary relief. She says the pain has been worsening steadily. Makes walking long distances difficult, trouble with stairs.  5/17/23: 14 days s/p R TKA. She is doing well and notes gradual improvement. Takes oxy at nighttime to aid in sleep.  6/19/23: 6.5 weeks s/p R TKA. doing PT and improving, denies n/v/f/c  9/7/23: 4 mon s/p R TKA. She continues to gradually improve. Pt has been beneficial  10/13/23 5 mo s/p R TKA, still having some pain at night and difficulty with stairs 12/29/23 7 mo s/p R TKA, pain improved, some feelings of stiffness but overall better, left knee giving her some trouble, feels stiff, no pain  6/7/24: here for follow up. She has stiffness in the right knee. She has a lot of pain and stiffness in the left knee and foot.  [] : no [FreeTextEntry1] : right knee [de-identified] : 10/13/23 [de-identified] : Dr. fleming [de-identified] : therapy

## 2024-06-07 NOTE — ASSESSMENT
[FreeTextEntry1] : 68F 1 year s/p R TKA, painful L TKA  f/u MRI L knee f/u esr crp cbc Continue PT/HEP Progress activities as tolerated return after labs and imaging   We discussed the patient's progress and they were reminded of their antibiotic prophylaxis. We discussed continued physical therapy and/or a home exercise program. Questions about their knee replacement and future follow up were answered and discussed.

## 2024-06-28 ENCOUNTER — APPOINTMENT (OUTPATIENT)
Dept: ORTHOPEDIC SURGERY | Facility: CLINIC | Age: 69
End: 2024-06-28
Payer: MEDICARE

## 2024-06-28 DIAGNOSIS — Z96.652 PRESENCE OF LEFT ARTIFICIAL KNEE JOINT: ICD-10-CM

## 2024-06-28 PROCEDURE — 99215 OFFICE O/P EST HI 40 MIN: CPT

## 2024-07-01 ENCOUNTER — APPOINTMENT (OUTPATIENT)
Dept: UROLOGY | Facility: CLINIC | Age: 69
End: 2024-07-01
Payer: MEDICARE

## 2024-07-01 DIAGNOSIS — N89.8 OTHER SPECIFIED NONINFLAMMATORY DISORDERS OF VAGINA: ICD-10-CM

## 2024-07-01 DIAGNOSIS — R31.29 OTHER MICROSCOPIC HEMATURIA: ICD-10-CM

## 2024-07-01 PROCEDURE — 99204 OFFICE O/P NEW MOD 45 MIN: CPT

## 2024-07-02 ENCOUNTER — NON-APPOINTMENT (OUTPATIENT)
Age: 69
End: 2024-07-02

## 2024-07-02 LAB
APPEARANCE: CLEAR
BACTERIA UR CULT: NORMAL
BACTERIA: NEGATIVE /HPF
BILIRUBIN URINE: NEGATIVE
BLOOD URINE: NEGATIVE
CAST: 0 /LPF
COLOR: YELLOW
EPITHELIAL CELLS: 0 /HPF
GLUCOSE QUALITATIVE U: NEGATIVE MG/DL
KETONES URINE: NEGATIVE MG/DL
LEUKOCYTE ESTERASE URINE: NEGATIVE
MICROSCOPIC-UA: NORMAL
NITRITE URINE: NEGATIVE
PH URINE: 7.5
PROTEIN URINE: NEGATIVE MG/DL
RED BLOOD CELLS URINE: 0 /HPF
SPECIFIC GRAVITY URINE: 1.01
UROBILINOGEN URINE: 0.2 MG/DL
WHITE BLOOD CELLS URINE: 0 /HPF

## 2024-07-12 ENCOUNTER — APPOINTMENT (OUTPATIENT)
Dept: CT IMAGING | Facility: IMAGING CENTER | Age: 69
End: 2024-07-12

## 2024-07-24 ENCOUNTER — APPOINTMENT (OUTPATIENT)
Dept: UROLOGY | Facility: CLINIC | Age: 69
End: 2024-07-24
Payer: MEDICARE

## 2024-07-24 VITALS
HEART RATE: 55 BPM | OXYGEN SATURATION: 99 % | DIASTOLIC BLOOD PRESSURE: 80 MMHG | SYSTOLIC BLOOD PRESSURE: 145 MMHG | TEMPERATURE: 98 F

## 2024-07-24 DIAGNOSIS — R31.29 OTHER MICROSCOPIC HEMATURIA: ICD-10-CM

## 2024-07-24 PROCEDURE — 52000 CYSTOURETHROSCOPY: CPT

## 2024-07-28 ENCOUNTER — TRANSCRIPTION ENCOUNTER (OUTPATIENT)
Age: 69
End: 2024-07-28

## 2024-07-29 ENCOUNTER — TRANSCRIPTION ENCOUNTER (OUTPATIENT)
Age: 69
End: 2024-07-29

## 2024-07-29 ENCOUNTER — APPOINTMENT (OUTPATIENT)
Dept: ORTHOPEDIC SURGERY | Facility: HOSPITAL | Age: 69
End: 2024-07-29
Payer: MEDICARE

## 2024-07-29 ENCOUNTER — RESULT REVIEW (OUTPATIENT)
Age: 69
End: 2024-07-29

## 2024-07-29 PROCEDURE — 27487 REVISE/REPLACE KNEE JOINT: CPT | Mod: LT

## 2024-07-30 ENCOUNTER — TRANSCRIPTION ENCOUNTER (OUTPATIENT)
Age: 69
End: 2024-07-30

## 2024-08-16 ENCOUNTER — APPOINTMENT (OUTPATIENT)
Dept: ORTHOPEDIC SURGERY | Facility: CLINIC | Age: 69
End: 2024-08-16
Payer: MEDICARE

## 2024-08-16 DIAGNOSIS — Z96.652 PRESENCE OF LEFT ARTIFICIAL KNEE JOINT: ICD-10-CM

## 2024-08-16 PROCEDURE — 99024 POSTOP FOLLOW-UP VISIT: CPT

## 2024-08-16 PROCEDURE — 73562 X-RAY EXAM OF KNEE 3: CPT | Mod: LT

## 2024-08-16 NOTE — ASSESSMENT
[FreeTextEntry1] : 69F 2.5 weeks s/p L TKA revision   Begin outpt PT return 4 weeks   We discussed the patient's progress and they were reminded of their antibiotic prophylaxis. We discussed continued physical therapy and/or a home exercise program. Questions about their knee replacement and future follow up were answered and discussed.

## 2024-08-16 NOTE — PHYSICAL EXAM
[de-identified] : LEFT KNEE Incision is clean and dry with no drainage -  Sensation intact Motor 5/5 +1 edema LE ROM 0-105   Xray 3 views Left knee - Implants good position and well fixed

## 2024-08-16 NOTE — HISTORY OF PRESENT ILLNESS
[Dull/Aching] : dull/aching [Intermittent] : intermittent [] : Post Surgical Visit: yes [de-identified] : 67F p/w R knee pain. Has had pain for many years, she has tried PT and injections in the past with temporary relief. She says the pain has been worsening steadily. Makes walking long distances difficult, trouble with stairs.  5/17/23: 14 days s/p R TKA. She is doing well and notes gradual improvement. Takes oxy at nighttime to aid in sleep.  6/19/23: 6.5 weeks s/p R TKA. doing PT and improving, denies n/v/f/c  9/7/23: 4 mon s/p R TKA. She continues to gradually improve. Pt has been beneficial  10/13/23 5 mo s/p R TKA, still having some pain at night and difficulty with stairs 12/29/23 7 mo s/p R TKA, pain improved, some feelings of stiffness but overall better, left knee giving her some trouble, feels stiff, no pain  6/7/24: here for follow up. She has stiffness in the right knee. She has a lot of pain and stiffness in the left knee and foot.   6/28/24: here for MRI results of lt knee. she has continued pain and swelling, has had pain since surgery and knee has never felt right. pain will reach an 8 of 10 most days  8/16/24: 18 days s/p L TKA revision. doing well, pain improving denies n/v/f/c [FreeTextEntry1] : left knee  [FreeTextEntry5] : NEETA is here today for left knee post op #1. c/o minimal pain today, controlled with oxy, Tylenol and Celebrex. had last home PT session 08/14/24.  [de-identified] : 07/29/24 [de-identified] : LEFT TOTAL KNEE REVISION

## 2024-09-02 ENCOUNTER — NON-APPOINTMENT (OUTPATIENT)
Age: 69
End: 2024-09-02

## 2024-09-20 ENCOUNTER — APPOINTMENT (OUTPATIENT)
Dept: ORTHOPEDIC SURGERY | Facility: CLINIC | Age: 69
End: 2024-09-20
Payer: MEDICARE

## 2024-09-20 VITALS — WEIGHT: 160 LBS | HEIGHT: 65 IN | BODY MASS INDEX: 26.66 KG/M2

## 2024-09-20 DIAGNOSIS — Z96.652 PRESENCE OF LEFT ARTIFICIAL KNEE JOINT: ICD-10-CM

## 2024-09-20 PROCEDURE — 99024 POSTOP FOLLOW-UP VISIT: CPT

## 2024-09-20 PROCEDURE — 73562 X-RAY EXAM OF KNEE 3: CPT | Mod: LT

## 2024-09-20 NOTE — ASSESSMENT
[FreeTextEntry1] : 69F 6 weeks s/p L TKA revision   Cont outpt PT return 10 weeks   We discussed the patient's progress and they were reminded of their antibiotic prophylaxis. We discussed continued physical therapy and/or a home exercise program. Questions about their knee replacement and future follow up were answered and discussed.

## 2024-09-20 NOTE — HISTORY OF PRESENT ILLNESS
[de-identified] : 9/20/24 pt is here for 3rd post op on LT knee, pt says she feels stiffness and some tightness, she was using cane but for the past 2 days she has been walking without it, she is feeling much better than before surgery

## 2024-09-20 NOTE — PHYSICAL EXAM
[de-identified] : LEFT KNEE Incision is clean and dry with no drainage -  Sensation intact Motor 5/5 +1 edema LE ROM 0-115   Xray 3 views Left knee - Implants good position and well fixed

## (undated) DEVICE — SUCTION YANKAUER TAPERED BULBOUS NO VENT

## (undated) DEVICE — ELCTR GROUNDING PAD ADULT COVIDIEN

## (undated) DEVICE — SAW BLADE STRYKER SAGITTAL DUAL CUT 18X90X1.19MM

## (undated) DEVICE — SUT STRATAFIX SPIRAL MONOCRYL PLUS 4-0 45CM PS-2 UNDYED

## (undated) DEVICE — ZIMMER PULSAVAC PLUS FAN KIT

## (undated) DEVICE — FRA-ESU BOVIE FORCE TRIAD T6D04548DX: Type: DURABLE MEDICAL EQUIPMENT

## (undated) DEVICE — SOL IRR BAG NS 0.9% 3000ML

## (undated) DEVICE — WARMING BLANKET UPPER ADULT

## (undated) DEVICE — DRAPE TOWEL BLUE 17" X 24"

## (undated) DEVICE — PREP CHLORAPREP HI-LITE ORANGE 26ML

## (undated) DEVICE — DRAPE IOBAN 33" X 23"

## (undated) DEVICE — SYR LUER LOK 20CC

## (undated) DEVICE — PACK BASIC

## (undated) DEVICE — MIXER BONE CEMENT EVAC III

## (undated) DEVICE — DRSG DERMABOND PRINEO 22CM

## (undated) DEVICE — SUT PDO 2 1/2 CIRCLE 40MM NDL 45CM

## (undated) DEVICE — SUT VICRYL 0 27" CT-1 UNDYED

## (undated) DEVICE — DRAPE 3/4 SHEET W REINFORCEMENT 56X77"

## (undated) DEVICE — DRAPE U 47X51" LF STERILE

## (undated) DEVICE — PREP SCRUB BRUSH W CHG 4%

## (undated) DEVICE — WOUND IRR IRRISEPT W 0.5 CHG

## (undated) DEVICE — DRSG WEBRIL 6"

## (undated) DEVICE — DRAPE EXTREMITY 87" X 128.5"

## (undated) DEVICE — CRYO/CUFF GRAVITY COOLER KNEE LARGE

## (undated) DEVICE — SUT VICRYL 2-0 27" CT-1 UNDYED

## (undated) DEVICE — PACK TOTAL JOINT

## (undated) DEVICE — VENODYNE/SCD SLEEVE CALF MEDIUM

## (undated) DEVICE — MEDICATION LABELS W MARKER

## (undated) DEVICE — MARKING PEN W RULER

## (undated) DEVICE — DRAPE INSTRUMENT POUCH 6.75" X 11"

## (undated) DEVICE — NDL HYPO SAFE 18G X 1.5" (PINK)

## (undated) DEVICE — GOWN XL

## (undated) DEVICE — SAW BLADE STRYKER SAGITTAL EXTRA WIDE THIN SHORT

## (undated) DEVICE — HOOD T5 PEELAWAY